# Patient Record
Sex: FEMALE | Race: BLACK OR AFRICAN AMERICAN | NOT HISPANIC OR LATINO
[De-identification: names, ages, dates, MRNs, and addresses within clinical notes are randomized per-mention and may not be internally consistent; named-entity substitution may affect disease eponyms.]

---

## 2017-01-13 ENCOUNTER — APPOINTMENT (OUTPATIENT)
Dept: BARIATRICS | Facility: CLINIC | Age: 52
End: 2017-01-13

## 2017-01-13 VITALS
DIASTOLIC BLOOD PRESSURE: 92 MMHG | WEIGHT: 226.63 LBS | SYSTOLIC BLOOD PRESSURE: 132 MMHG | HEIGHT: 66 IN | BODY MASS INDEX: 36.42 KG/M2

## 2017-03-27 ENCOUNTER — APPOINTMENT (OUTPATIENT)
Dept: BARIATRICS/WEIGHT MGMT | Facility: CLINIC | Age: 52
End: 2017-03-27

## 2018-07-10 ENCOUNTER — APPOINTMENT (OUTPATIENT)
Dept: BARIATRICS | Facility: CLINIC | Age: 53
End: 2018-07-10
Payer: COMMERCIAL

## 2018-07-10 VITALS
WEIGHT: 240.3 LBS | DIASTOLIC BLOOD PRESSURE: 80 MMHG | SYSTOLIC BLOOD PRESSURE: 140 MMHG | HEIGHT: 66 IN | BODY MASS INDEX: 38.62 KG/M2 | HEART RATE: 96 BPM | OXYGEN SATURATION: 98 %

## 2018-07-10 PROCEDURE — 99215 OFFICE O/P EST HI 40 MIN: CPT

## 2018-08-07 ENCOUNTER — APPOINTMENT (OUTPATIENT)
Dept: BARIATRICS | Facility: CLINIC | Age: 53
End: 2018-08-07
Payer: COMMERCIAL

## 2018-08-07 VITALS
HEART RATE: 104 BPM | OXYGEN SATURATION: 99 % | BODY MASS INDEX: 38.41 KG/M2 | HEIGHT: 66 IN | SYSTOLIC BLOOD PRESSURE: 128 MMHG | WEIGHT: 239 LBS | DIASTOLIC BLOOD PRESSURE: 84 MMHG

## 2018-08-07 DIAGNOSIS — Z98.84 BARIATRIC SURGERY STATUS: ICD-10-CM

## 2018-08-07 PROCEDURE — 99214 OFFICE O/P EST MOD 30 MIN: CPT

## 2018-09-06 ENCOUNTER — APPOINTMENT (OUTPATIENT)
Dept: BARIATRICS | Facility: CLINIC | Age: 53
End: 2018-09-06
Payer: COMMERCIAL

## 2018-09-06 VITALS
HEART RATE: 78 BPM | HEIGHT: 66 IN | OXYGEN SATURATION: 99 % | WEIGHT: 237 LBS | BODY MASS INDEX: 38.09 KG/M2 | DIASTOLIC BLOOD PRESSURE: 86 MMHG | SYSTOLIC BLOOD PRESSURE: 131 MMHG

## 2018-09-06 PROCEDURE — 99214 OFFICE O/P EST MOD 30 MIN: CPT

## 2018-09-28 ENCOUNTER — APPOINTMENT (OUTPATIENT)
Dept: BARIATRICS/WEIGHT MGMT | Facility: CLINIC | Age: 53
End: 2018-09-28
Payer: COMMERCIAL

## 2018-09-28 VITALS — HEIGHT: 66 IN | WEIGHT: 245 LBS | BODY MASS INDEX: 39.37 KG/M2

## 2018-09-28 PROCEDURE — 97802 MEDICAL NUTRITION INDIV IN: CPT

## 2018-10-02 ENCOUNTER — APPOINTMENT (OUTPATIENT)
Dept: BARIATRICS/WEIGHT MGMT | Facility: CLINIC | Age: 53
End: 2018-10-02
Payer: COMMERCIAL

## 2018-10-02 ENCOUNTER — APPOINTMENT (OUTPATIENT)
Dept: BARIATRICS | Facility: CLINIC | Age: 53
End: 2018-10-02
Payer: COMMERCIAL

## 2018-10-02 VITALS
HEIGHT: 66 IN | WEIGHT: 244.5 LBS | DIASTOLIC BLOOD PRESSURE: 93 MMHG | HEART RATE: 66 BPM | SYSTOLIC BLOOD PRESSURE: 132 MMHG | OXYGEN SATURATION: 98 % | BODY MASS INDEX: 39.29 KG/M2

## 2018-10-02 DIAGNOSIS — Z78.9 OTHER SPECIFIED HEALTH STATUS: ICD-10-CM

## 2018-10-02 DIAGNOSIS — Z87.19 PERSONAL HISTORY OF OTHER DISEASES OF THE DIGESTIVE SYSTEM: ICD-10-CM

## 2018-10-02 PROCEDURE — 99213 OFFICE O/P EST LOW 20 MIN: CPT

## 2018-10-02 PROCEDURE — 90791 PSYCH DIAGNOSTIC EVALUATION: CPT

## 2018-10-04 ENCOUNTER — FORM ENCOUNTER (OUTPATIENT)
Age: 53
End: 2018-10-04

## 2018-10-05 ENCOUNTER — OUTPATIENT (OUTPATIENT)
Dept: OUTPATIENT SERVICES | Facility: HOSPITAL | Age: 53
LOS: 1 days | End: 2018-10-05
Payer: COMMERCIAL

## 2018-10-05 ENCOUNTER — APPOINTMENT (OUTPATIENT)
Dept: ULTRASOUND IMAGING | Facility: CLINIC | Age: 53
End: 2018-10-05
Payer: COMMERCIAL

## 2018-10-05 DIAGNOSIS — Z00.8 ENCOUNTER FOR OTHER GENERAL EXAMINATION: ICD-10-CM

## 2018-10-05 DIAGNOSIS — Z87.898 PERSONAL HISTORY OF OTHER SPECIFIED CONDITIONS: Chronic | ICD-10-CM

## 2018-10-05 DIAGNOSIS — Z98.89 OTHER SPECIFIED POSTPROCEDURAL STATES: Chronic | ICD-10-CM

## 2018-10-05 PROCEDURE — 76700 US EXAM ABDOM COMPLETE: CPT | Mod: 26

## 2018-10-05 PROCEDURE — 76700 US EXAM ABDOM COMPLETE: CPT

## 2018-10-07 ENCOUNTER — FORM ENCOUNTER (OUTPATIENT)
Age: 53
End: 2018-10-07

## 2018-10-08 ENCOUNTER — OUTPATIENT (OUTPATIENT)
Dept: OUTPATIENT SERVICES | Facility: HOSPITAL | Age: 53
LOS: 1 days | End: 2018-10-08
Payer: COMMERCIAL

## 2018-10-08 ENCOUNTER — APPOINTMENT (OUTPATIENT)
Dept: ULTRASOUND IMAGING | Facility: CLINIC | Age: 53
End: 2018-10-08
Payer: COMMERCIAL

## 2018-10-08 DIAGNOSIS — Z98.89 OTHER SPECIFIED POSTPROCEDURAL STATES: Chronic | ICD-10-CM

## 2018-10-08 DIAGNOSIS — Z00.8 ENCOUNTER FOR OTHER GENERAL EXAMINATION: ICD-10-CM

## 2018-10-08 DIAGNOSIS — Z87.898 PERSONAL HISTORY OF OTHER SPECIFIED CONDITIONS: Chronic | ICD-10-CM

## 2018-10-08 PROCEDURE — 93970 EXTREMITY STUDY: CPT | Mod: 26

## 2018-10-08 PROCEDURE — 93970 EXTREMITY STUDY: CPT

## 2018-10-09 ENCOUNTER — APPOINTMENT (OUTPATIENT)
Dept: CARDIOLOGY | Facility: CLINIC | Age: 53
End: 2018-10-09
Payer: COMMERCIAL

## 2018-10-09 ENCOUNTER — APPOINTMENT (OUTPATIENT)
Age: 53
End: 2018-10-09
Payer: COMMERCIAL

## 2018-10-09 ENCOUNTER — NON-APPOINTMENT (OUTPATIENT)
Age: 53
End: 2018-10-09

## 2018-10-09 VITALS
OXYGEN SATURATION: 99 % | HEART RATE: 67 BPM | DIASTOLIC BLOOD PRESSURE: 84 MMHG | HEIGHT: 66 IN | SYSTOLIC BLOOD PRESSURE: 125 MMHG | BODY MASS INDEX: 38.89 KG/M2 | WEIGHT: 242 LBS

## 2018-10-09 PROCEDURE — 93306 TTE W/DOPPLER COMPLETE: CPT

## 2018-10-09 PROCEDURE — 93000 ELECTROCARDIOGRAM COMPLETE: CPT

## 2018-10-09 PROCEDURE — 99244 OFF/OP CNSLTJ NEW/EST MOD 40: CPT | Mod: 25

## 2018-10-09 RX ORDER — PROPRANOLOL HYDROCHLORIDE 120 MG/1
120 CAPSULE, EXTENDED RELEASE ORAL TWICE DAILY
Refills: 0 | Status: ACTIVE | COMMUNITY

## 2018-10-29 ENCOUNTER — APPOINTMENT (OUTPATIENT)
Dept: CARDIOLOGY | Facility: CLINIC | Age: 53
End: 2018-10-29
Payer: COMMERCIAL

## 2018-10-29 ENCOUNTER — APPOINTMENT (OUTPATIENT)
Dept: GASTROENTEROLOGY | Facility: CLINIC | Age: 53
End: 2018-10-29
Payer: COMMERCIAL

## 2018-10-29 VITALS
HEART RATE: 79 BPM | WEIGHT: 240 LBS | RESPIRATION RATE: 15 BRPM | OXYGEN SATURATION: 98 % | BODY MASS INDEX: 38.57 KG/M2 | SYSTOLIC BLOOD PRESSURE: 122 MMHG | DIASTOLIC BLOOD PRESSURE: 80 MMHG | TEMPERATURE: 98 F | HEIGHT: 66 IN

## 2018-10-29 DIAGNOSIS — Z86.79 PERSONAL HISTORY OF OTHER DISEASES OF THE CIRCULATORY SYSTEM: ICD-10-CM

## 2018-10-29 PROCEDURE — 99202 OFFICE O/P NEW SF 15 MIN: CPT

## 2018-10-29 PROCEDURE — 93015 CV STRESS TEST SUPVJ I&R: CPT

## 2018-10-30 ENCOUNTER — APPOINTMENT (OUTPATIENT)
Dept: BARIATRICS | Facility: CLINIC | Age: 53
End: 2018-10-30
Payer: COMMERCIAL

## 2018-10-30 VITALS
DIASTOLIC BLOOD PRESSURE: 86 MMHG | HEART RATE: 67 BPM | HEIGHT: 66 IN | OXYGEN SATURATION: 98 % | SYSTOLIC BLOOD PRESSURE: 124 MMHG | WEIGHT: 240 LBS | BODY MASS INDEX: 38.57 KG/M2

## 2018-10-30 PROCEDURE — 99214 OFFICE O/P EST MOD 30 MIN: CPT

## 2018-11-06 LAB
25(OH)D3 SERPL-MCNC: 7.3 NG/ML
ALBUMIN SERPL ELPH-MCNC: 4.1 G/DL
ALP BLD-CCNC: 91 U/L
ALT SERPL-CCNC: 18 U/L
ANION GAP SERPL CALC-SCNC: 8 MMOL/L
AST SERPL-CCNC: 22 U/L
BASOPHILS # BLD AUTO: 0.03 K/UL
BASOPHILS NFR BLD AUTO: 0.5 %
BILIRUB SERPL-MCNC: 0.8 MG/DL
BUN SERPL-MCNC: 9 MG/DL
CALCIUM SERPL-MCNC: 11.2 MG/DL
CALCIUM SERPL-MCNC: 11.2 MG/DL
CHLORIDE SERPL-SCNC: 108 MMOL/L
CHOLEST SERPL-MCNC: 202 MG/DL
CHOLEST/HDLC SERPL: 2.8 RATIO
CO2 SERPL-SCNC: 26 MMOL/L
CREAT SERPL-MCNC: 0.7 MG/DL
EOSINOPHIL # BLD AUTO: 0.19 K/UL
EOSINOPHIL NFR BLD AUTO: 3 %
FERRITIN SERPL-MCNC: 92 NG/ML
FOLATE SERPL-MCNC: 5.1 NG/ML
GLUCOSE SERPL-MCNC: 133 MG/DL
HBA1C MFR BLD HPLC: 6.1 %
HCT VFR BLD CALC: 40.6 %
HDLC SERPL-MCNC: 73 MG/DL
HGB BLD-MCNC: 12.9 G/DL
IMM GRANULOCYTES NFR BLD AUTO: 0.2 %
INR PPP: 1.03 RATIO
IRON SATN MFR SERPL: 37 %
IRON SERPL-MCNC: 119 UG/DL
LDLC SERPL CALC-MCNC: 112 MG/DL
LYMPHOCYTES # BLD AUTO: 2.39 K/UL
LYMPHOCYTES NFR BLD AUTO: 37.9 %
MAN DIFF?: NORMAL
MCHC RBC-ENTMCNC: 27.6 PG
MCHC RBC-ENTMCNC: 31.8 GM/DL
MCV RBC AUTO: 86.9 FL
MONOCYTES # BLD AUTO: 0.36 K/UL
MONOCYTES NFR BLD AUTO: 5.7 %
NEUTROPHILS # BLD AUTO: 3.33 K/UL
NEUTROPHILS NFR BLD AUTO: 52.7 %
PARATHYROID HORMONE INTACT: 117 PG/ML
PLATELET # BLD AUTO: 328 K/UL
POTASSIUM SERPL-SCNC: 4.5 MMOL/L
PROT SERPL-MCNC: 7.1 G/DL
PT BLD: 11.7 SEC
RBC # BLD: 4.67 M/UL
RBC # FLD: 14.3 %
SODIUM SERPL-SCNC: 142 MMOL/L
TIBC SERPL-MCNC: 324 UG/DL
TRIGL SERPL-MCNC: 87 MG/DL
TSH SERPL-ACNC: 0.9 UIU/ML
UIBC SERPL-MCNC: 205 UG/DL
VIT A SERPL-MCNC: 30.6 UG/DL
VIT B1 SERPL-MCNC: 103 NMOL/L
VIT B12 SERPL-MCNC: 449 PG/ML
VIT B6 SERPL-MCNC: 11.4 UG/L
WBC # FLD AUTO: 6.31 K/UL

## 2018-11-13 ENCOUNTER — APPOINTMENT (OUTPATIENT)
Dept: GASTROENTEROLOGY | Facility: AMBULATORY MEDICAL SERVICES | Age: 53
End: 2018-11-13
Payer: COMMERCIAL

## 2018-11-13 PROCEDURE — 45380 COLONOSCOPY AND BIOPSY: CPT

## 2018-11-13 PROCEDURE — 43239 EGD BIOPSY SINGLE/MULTIPLE: CPT

## 2018-11-26 ENCOUNTER — RESULT REVIEW (OUTPATIENT)
Age: 53
End: 2018-11-26

## 2018-11-28 ENCOUNTER — OUTPATIENT (OUTPATIENT)
Dept: OUTPATIENT SERVICES | Facility: HOSPITAL | Age: 53
LOS: 1 days | End: 2018-11-28
Payer: COMMERCIAL

## 2018-11-28 ENCOUNTER — APPOINTMENT (OUTPATIENT)
Dept: BARIATRICS | Facility: CLINIC | Age: 53
End: 2018-11-28
Payer: COMMERCIAL

## 2018-11-28 VITALS
BODY MASS INDEX: 39.15 KG/M2 | SYSTOLIC BLOOD PRESSURE: 122 MMHG | HEIGHT: 66 IN | WEIGHT: 243.59 LBS | HEART RATE: 77 BPM | DIASTOLIC BLOOD PRESSURE: 90 MMHG | OXYGEN SATURATION: 99 %

## 2018-11-28 DIAGNOSIS — Z98.84 BARIATRIC SURGERY STATUS: ICD-10-CM

## 2018-11-28 DIAGNOSIS — Z87.898 PERSONAL HISTORY OF OTHER SPECIFIED CONDITIONS: Chronic | ICD-10-CM

## 2018-11-28 DIAGNOSIS — Z98.89 OTHER SPECIFIED POSTPROCEDURAL STATES: Chronic | ICD-10-CM

## 2018-11-28 DIAGNOSIS — Z78.9 OTHER SPECIFIED HEALTH STATUS: ICD-10-CM

## 2018-11-28 DIAGNOSIS — E66.9 OBESITY, UNSPECIFIED: ICD-10-CM

## 2018-11-28 PROCEDURE — 74220 X-RAY XM ESOPHAGUS 1CNTRST: CPT

## 2018-11-28 PROCEDURE — 74220 X-RAY XM ESOPHAGUS 1CNTRST: CPT | Mod: 26

## 2018-11-28 PROCEDURE — 99214 OFFICE O/P EST MOD 30 MIN: CPT

## 2018-11-28 RX ORDER — IBUPROFEN 600 MG/1
600 TABLET, FILM COATED ORAL
Qty: 30 | Refills: 0 | Status: DISCONTINUED | COMMUNITY
Start: 2018-07-19

## 2018-11-28 RX ORDER — SULFAMETHOXAZOLE AND TRIMETHOPRIM 800; 160 MG/1; MG/1
800-160 TABLET ORAL
Qty: 14 | Refills: 0 | Status: DISCONTINUED | COMMUNITY
Start: 2018-07-19

## 2018-11-28 RX ORDER — CEPHALEXIN 500 MG/1
500 CAPSULE ORAL
Qty: 21 | Refills: 0 | Status: DISCONTINUED | COMMUNITY
Start: 2018-07-19

## 2018-11-28 RX ORDER — FLUCONAZOLE 150 MG/1
150 TABLET ORAL
Qty: 1 | Refills: 0 | Status: DISCONTINUED | COMMUNITY
Start: 2018-07-19

## 2018-11-29 ENCOUNTER — APPOINTMENT (OUTPATIENT)
Dept: NUCLEAR MEDICINE | Facility: IMAGING CENTER | Age: 53
End: 2018-11-29

## 2018-12-14 ENCOUNTER — APPOINTMENT (OUTPATIENT)
Dept: BARIATRICS | Facility: CLINIC | Age: 53
End: 2018-12-14
Payer: COMMERCIAL

## 2018-12-14 VITALS
WEIGHT: 235.89 LBS | HEIGHT: 66 IN | DIASTOLIC BLOOD PRESSURE: 80 MMHG | SYSTOLIC BLOOD PRESSURE: 130 MMHG | HEART RATE: 80 BPM | OXYGEN SATURATION: 97 % | BODY MASS INDEX: 37.91 KG/M2

## 2018-12-14 PROCEDURE — 99214 OFFICE O/P EST MOD 30 MIN: CPT

## 2018-12-21 ENCOUNTER — OUTPATIENT (OUTPATIENT)
Dept: OUTPATIENT SERVICES | Facility: HOSPITAL | Age: 53
LOS: 1 days | End: 2018-12-21
Payer: COMMERCIAL

## 2018-12-21 VITALS
TEMPERATURE: 98 F | HEIGHT: 67 IN | OXYGEN SATURATION: 100 % | HEART RATE: 75 BPM | SYSTOLIC BLOOD PRESSURE: 115 MMHG | DIASTOLIC BLOOD PRESSURE: 82 MMHG | RESPIRATION RATE: 16 BRPM | WEIGHT: 246.04 LBS

## 2018-12-21 DIAGNOSIS — G47.33 OBSTRUCTIVE SLEEP APNEA (ADULT) (PEDIATRIC): ICD-10-CM

## 2018-12-21 DIAGNOSIS — Z98.84 BARIATRIC SURGERY STATUS: Chronic | ICD-10-CM

## 2018-12-21 DIAGNOSIS — Z98.84 BARIATRIC SURGERY STATUS: ICD-10-CM

## 2018-12-21 DIAGNOSIS — G47.30 SLEEP APNEA, UNSPECIFIED: ICD-10-CM

## 2018-12-21 DIAGNOSIS — I10 ESSENTIAL (PRIMARY) HYPERTENSION: ICD-10-CM

## 2018-12-21 DIAGNOSIS — Z87.898 PERSONAL HISTORY OF OTHER SPECIFIED CONDITIONS: Chronic | ICD-10-CM

## 2018-12-21 DIAGNOSIS — E66.9 OBESITY, UNSPECIFIED: ICD-10-CM

## 2018-12-21 DIAGNOSIS — Z98.89 OTHER SPECIFIED POSTPROCEDURAL STATES: Chronic | ICD-10-CM

## 2018-12-21 DIAGNOSIS — Z01.818 ENCOUNTER FOR OTHER PREPROCEDURAL EXAMINATION: ICD-10-CM

## 2018-12-21 LAB
ALBUMIN SERPL ELPH-MCNC: 3.5 G/DL — SIGNIFICANT CHANGE UP (ref 3.3–5)
ALP SERPL-CCNC: 117 U/L — SIGNIFICANT CHANGE UP (ref 30–120)
ALT FLD-CCNC: 29 U/L DA — SIGNIFICANT CHANGE UP (ref 10–60)
ANION GAP SERPL CALC-SCNC: 8 MMOL/L — SIGNIFICANT CHANGE UP (ref 5–17)
AST SERPL-CCNC: 16 U/L — SIGNIFICANT CHANGE UP (ref 10–40)
BILIRUB SERPL-MCNC: 0.6 MG/DL — SIGNIFICANT CHANGE UP (ref 0.2–1.2)
BLD GP AB SCN SERPL QL: SIGNIFICANT CHANGE UP
BUN SERPL-MCNC: 13 MG/DL — SIGNIFICANT CHANGE UP (ref 7–23)
CALCIUM SERPL-MCNC: 10.7 MG/DL — HIGH (ref 8.4–10.5)
CHLORIDE SERPL-SCNC: 107 MMOL/L — SIGNIFICANT CHANGE UP (ref 96–108)
CO2 SERPL-SCNC: 27 MMOL/L — SIGNIFICANT CHANGE UP (ref 22–31)
CREAT SERPL-MCNC: 0.93 MG/DL — SIGNIFICANT CHANGE UP (ref 0.5–1.3)
GLUCOSE SERPL-MCNC: 105 MG/DL — HIGH (ref 70–99)
HCT VFR BLD CALC: 43.3 % — SIGNIFICANT CHANGE UP (ref 34.5–45)
HGB BLD-MCNC: 14 G/DL — SIGNIFICANT CHANGE UP (ref 11.5–15.5)
MCHC RBC-ENTMCNC: 29.2 PG — SIGNIFICANT CHANGE UP (ref 27–34)
MCHC RBC-ENTMCNC: 32.3 GM/DL — SIGNIFICANT CHANGE UP (ref 32–36)
MCV RBC AUTO: 90.4 FL — SIGNIFICANT CHANGE UP (ref 80–100)
NRBC # BLD: 0 /100 WBCS — SIGNIFICANT CHANGE UP (ref 0–0)
PLATELET # BLD AUTO: 306 K/UL — SIGNIFICANT CHANGE UP (ref 150–400)
POTASSIUM SERPL-MCNC: 4.1 MMOL/L — SIGNIFICANT CHANGE UP (ref 3.5–5.3)
POTASSIUM SERPL-SCNC: 4.1 MMOL/L — SIGNIFICANT CHANGE UP (ref 3.5–5.3)
PROT SERPL-MCNC: 7.3 G/DL — SIGNIFICANT CHANGE UP (ref 6–8.3)
RBC # BLD: 4.79 M/UL — SIGNIFICANT CHANGE UP (ref 3.8–5.2)
RBC # FLD: 14.3 % — SIGNIFICANT CHANGE UP (ref 10.3–14.5)
SODIUM SERPL-SCNC: 142 MMOL/L — SIGNIFICANT CHANGE UP (ref 135–145)
WBC # BLD: 6.21 K/UL — SIGNIFICANT CHANGE UP (ref 3.8–10.5)
WBC # FLD AUTO: 6.21 K/UL — SIGNIFICANT CHANGE UP (ref 3.8–10.5)

## 2018-12-21 PROCEDURE — 86850 RBC ANTIBODY SCREEN: CPT

## 2018-12-21 PROCEDURE — 86901 BLOOD TYPING SEROLOGIC RH(D): CPT

## 2018-12-21 PROCEDURE — 86900 BLOOD TYPING SEROLOGIC ABO: CPT

## 2018-12-21 PROCEDURE — G0463: CPT

## 2018-12-21 PROCEDURE — 80053 COMPREHEN METABOLIC PANEL: CPT

## 2018-12-21 PROCEDURE — 85027 COMPLETE CBC AUTOMATED: CPT

## 2018-12-21 PROCEDURE — 36415 COLL VENOUS BLD VENIPUNCTURE: CPT

## 2018-12-21 NOTE — H&P PST ADULT - PROBLEM SELECTOR PLAN 1
Laparoscopic sleeve gastrectomy w/upper endoscopy on 1/7/18  Diagnostic testing  Pending medical clearance  Instructions reviewed and signed  Best wishes offered

## 2018-12-21 NOTE — H&P PST ADULT - NEGATIVE ENMT SYMPTOMS
no nasal discharge/no throat pain/no nose bleeds/no recurrent cold sores/no abnormal taste sensation/no gum bleeding/no dry mouth/no dysphagia/no ear pain/no tinnitus/no sinus symptoms/no nasal congestion/no nasal obstruction/no post-nasal discharge/no hearing difficulty/no vertigo

## 2018-12-21 NOTE — H&P PST ADULT - HISTORY OF PRESENT ILLNESS
52 yo female presents today for PST visit for scheduled laparoscopic sleeve gastrectomy with upper endoscopy on 1/7/18 with Yareli Tanner MD.  This patient presents no complaints today.

## 2018-12-28 RX ORDER — HYOSCYAMINE SULFATE 0.13 MG
0.12 TABLET ORAL EVERY 6 HOURS
Qty: 0 | Refills: 0 | Status: DISCONTINUED | OUTPATIENT
Start: 2019-01-07 | End: 2019-01-09

## 2018-12-28 RX ORDER — PANTOPRAZOLE SODIUM 20 MG/1
40 TABLET, DELAYED RELEASE ORAL DAILY
Qty: 0 | Refills: 0 | Status: DISCONTINUED | OUTPATIENT
Start: 2019-01-07 | End: 2019-01-09

## 2018-12-28 RX ORDER — ENOXAPARIN SODIUM 100 MG/ML
40 INJECTION SUBCUTANEOUS EVERY 12 HOURS
Qty: 0 | Refills: 0 | Status: DISCONTINUED | OUTPATIENT
Start: 2019-01-07 | End: 2019-01-09

## 2018-12-28 RX ORDER — ONDANSETRON 8 MG/1
4 TABLET, FILM COATED ORAL EVERY 6 HOURS
Qty: 0 | Refills: 0 | Status: DISCONTINUED | OUTPATIENT
Start: 2019-01-07 | End: 2019-01-09

## 2018-12-28 RX ORDER — ENOXAPARIN SODIUM 100 MG/ML
40 INJECTION SUBCUTANEOUS ONCE
Qty: 0 | Refills: 0 | Status: COMPLETED | OUTPATIENT
Start: 2019-01-07 | End: 2019-01-07

## 2018-12-28 RX ORDER — SODIUM CHLORIDE 9 MG/ML
1000 INJECTION, SOLUTION INTRAVENOUS
Qty: 0 | Refills: 0 | Status: DISCONTINUED | OUTPATIENT
Start: 2019-01-07 | End: 2019-01-09

## 2018-12-28 RX ORDER — SODIUM CHLORIDE 9 MG/ML
1000 INJECTION, SOLUTION INTRAVENOUS
Qty: 0 | Refills: 0 | Status: DISCONTINUED | OUTPATIENT
Start: 2019-01-07 | End: 2019-01-08

## 2018-12-28 RX ORDER — HYDROMORPHONE HYDROCHLORIDE 2 MG/ML
0.5 INJECTION INTRAMUSCULAR; INTRAVENOUS; SUBCUTANEOUS EVERY 4 HOURS
Qty: 0 | Refills: 0 | Status: DISCONTINUED | OUTPATIENT
Start: 2019-01-07 | End: 2019-01-09

## 2019-01-06 ENCOUNTER — TRANSCRIPTION ENCOUNTER (OUTPATIENT)
Age: 54
End: 2019-01-06

## 2019-01-07 ENCOUNTER — INPATIENT (INPATIENT)
Facility: HOSPITAL | Age: 54
LOS: 1 days | Discharge: HOME | DRG: 621 | End: 2019-01-09
Attending: SURGERY | Admitting: SURGERY
Payer: COMMERCIAL

## 2019-01-07 ENCOUNTER — APPOINTMENT (OUTPATIENT)
Dept: BARIATRICS | Facility: HOSPITAL | Age: 54
End: 2019-01-07
Payer: COMMERCIAL

## 2019-01-07 ENCOUNTER — RESULT REVIEW (OUTPATIENT)
Age: 54
End: 2019-01-07

## 2019-01-07 ENCOUNTER — TRANSCRIPTION ENCOUNTER (OUTPATIENT)
Age: 54
End: 2019-01-07

## 2019-01-07 VITALS
TEMPERATURE: 98 F | OXYGEN SATURATION: 100 % | WEIGHT: 241.19 LBS | RESPIRATION RATE: 12 BRPM | SYSTOLIC BLOOD PRESSURE: 148 MMHG | DIASTOLIC BLOOD PRESSURE: 84 MMHG | HEART RATE: 66 BPM

## 2019-01-07 DIAGNOSIS — Z98.89 OTHER SPECIFIED POSTPROCEDURAL STATES: Chronic | ICD-10-CM

## 2019-01-07 DIAGNOSIS — E66.9 OBESITY, UNSPECIFIED: ICD-10-CM

## 2019-01-07 DIAGNOSIS — Z98.84 BARIATRIC SURGERY STATUS: Chronic | ICD-10-CM

## 2019-01-07 DIAGNOSIS — Z98.84 BARIATRIC SURGERY STATUS: ICD-10-CM

## 2019-01-07 DIAGNOSIS — G47.30 SLEEP APNEA, UNSPECIFIED: ICD-10-CM

## 2019-01-07 DIAGNOSIS — I10 ESSENTIAL (PRIMARY) HYPERTENSION: ICD-10-CM

## 2019-01-07 LAB — HCG UR QL: NEGATIVE — SIGNIFICANT CHANGE UP

## 2019-01-07 PROCEDURE — 43775 LAP SLEEVE GASTRECTOMY: CPT | Mod: AS

## 2019-01-07 PROCEDURE — 88305 TISSUE EXAM BY PATHOLOGIST: CPT | Mod: 26

## 2019-01-07 PROCEDURE — 43775 LAP SLEEVE GASTRECTOMY: CPT | Mod: 22

## 2019-01-07 PROCEDURE — 88312 SPECIAL STAINS GROUP 1: CPT | Mod: 26

## 2019-01-07 RX ORDER — ONDANSETRON 8 MG/1
4 TABLET, FILM COATED ORAL ONCE
Qty: 0 | Refills: 0 | Status: DISCONTINUED | OUTPATIENT
Start: 2019-01-07 | End: 2019-01-07

## 2019-01-07 RX ORDER — CHLORHEXIDINE GLUCONATE 213 G/1000ML
1 SOLUTION TOPICAL ONCE
Qty: 0 | Refills: 0 | Status: COMPLETED | OUTPATIENT
Start: 2019-01-07 | End: 2019-01-07

## 2019-01-07 RX ORDER — APREPITANT 80 MG/1
40 CAPSULE ORAL ONCE
Qty: 0 | Refills: 0 | Status: COMPLETED | OUTPATIENT
Start: 2019-01-07 | End: 2019-01-07

## 2019-01-07 RX ORDER — SCOPALAMINE 1 MG/3D
1 PATCH, EXTENDED RELEASE TRANSDERMAL ONCE
Qty: 0 | Refills: 0 | Status: COMPLETED | OUTPATIENT
Start: 2019-01-07 | End: 2019-01-07

## 2019-01-07 RX ORDER — ACETAMINOPHEN 500 MG
1000 TABLET ORAL EVERY 6 HOURS
Qty: 0 | Refills: 0 | Status: COMPLETED | OUTPATIENT
Start: 2019-01-07 | End: 2019-01-08

## 2019-01-07 RX ORDER — CIPROFLOXACIN LACTATE 400MG/40ML
400 VIAL (ML) INTRAVENOUS ONCE
Qty: 0 | Refills: 0 | Status: COMPLETED | OUTPATIENT
Start: 2019-01-07 | End: 2019-01-07

## 2019-01-07 RX ORDER — IBUPROFEN 200 MG
800 TABLET ORAL EVERY 6 HOURS
Qty: 0 | Refills: 0 | Status: DISCONTINUED | OUTPATIENT
Start: 2019-01-07 | End: 2019-01-09

## 2019-01-07 RX ORDER — HYDROMORPHONE HYDROCHLORIDE 2 MG/ML
0.5 INJECTION INTRAMUSCULAR; INTRAVENOUS; SUBCUTANEOUS
Qty: 0 | Refills: 0 | Status: DISCONTINUED | OUTPATIENT
Start: 2019-01-07 | End: 2019-01-07

## 2019-01-07 RX ORDER — ACETAMINOPHEN 500 MG
1000 TABLET ORAL EVERY 6 HOURS
Qty: 0 | Refills: 0 | Status: DISCONTINUED | OUTPATIENT
Start: 2019-01-08 | End: 2019-01-08

## 2019-01-07 RX ORDER — ACETAMINOPHEN 500 MG
1000 TABLET ORAL ONCE
Qty: 0 | Refills: 0 | Status: COMPLETED | OUTPATIENT
Start: 2019-01-07 | End: 2019-01-07

## 2019-01-07 RX ORDER — SODIUM CHLORIDE 9 MG/ML
1000 INJECTION, SOLUTION INTRAVENOUS
Qty: 0 | Refills: 0 | Status: DISCONTINUED | OUTPATIENT
Start: 2019-01-07 | End: 2019-01-07

## 2019-01-07 RX ADMIN — APREPITANT 40 MILLIGRAM(S): 80 CAPSULE ORAL at 06:39

## 2019-01-07 RX ADMIN — Medication 516 MILLIGRAM(S): at 20:58

## 2019-01-07 RX ADMIN — SCOPALAMINE 1 PATCH: 1 PATCH, EXTENDED RELEASE TRANSDERMAL at 11:28

## 2019-01-07 RX ADMIN — HYDROMORPHONE HYDROCHLORIDE 0.5 MILLIGRAM(S): 2 INJECTION INTRAMUSCULAR; INTRAVENOUS; SUBCUTANEOUS at 12:48

## 2019-01-07 RX ADMIN — HYDROMORPHONE HYDROCHLORIDE 0.5 MILLIGRAM(S): 2 INJECTION INTRAMUSCULAR; INTRAVENOUS; SUBCUTANEOUS at 13:20

## 2019-01-07 RX ADMIN — SODIUM CHLORIDE 100 MILLILITER(S): 9 INJECTION, SOLUTION INTRAVENOUS at 12:24

## 2019-01-07 RX ADMIN — Medication 400 MILLIGRAM(S): at 20:59

## 2019-01-07 RX ADMIN — CHLORHEXIDINE GLUCONATE 1 APPLICATION(S): 213 SOLUTION TOPICAL at 06:30

## 2019-01-07 RX ADMIN — ONDANSETRON 4 MILLIGRAM(S): 8 TABLET, FILM COATED ORAL at 17:36

## 2019-01-07 RX ADMIN — Medication 400 MILLIGRAM(S): at 16:11

## 2019-01-07 RX ADMIN — Medication 800 MILLIGRAM(S): at 21:50

## 2019-01-07 RX ADMIN — Medication 516 MILLIGRAM(S): at 12:23

## 2019-01-07 RX ADMIN — Medication 800 MILLIGRAM(S): at 13:20

## 2019-01-07 RX ADMIN — ENOXAPARIN SODIUM 40 MILLIGRAM(S): 100 INJECTION SUBCUTANEOUS at 21:02

## 2019-01-07 RX ADMIN — Medication 1000 MILLIGRAM(S): at 21:30

## 2019-01-07 RX ADMIN — SODIUM CHLORIDE 1000 MILLILITER(S): 9 INJECTION, SOLUTION INTRAVENOUS at 06:30

## 2019-01-07 RX ADMIN — ENOXAPARIN SODIUM 40 MILLIGRAM(S): 100 INJECTION SUBCUTANEOUS at 07:30

## 2019-01-07 NOTE — CONSULT NOTE ADULT - SUBJECTIVE AND OBJECTIVE BOX
PULMONARY/CRITICAL CARE        Patient is a 53y old  Female who presents with a chief complaint of gastric sleeve  BRIEF HOSPITAL COURSE: ***    Events last 24 hours: ***    PAST MEDICAL & SURGICAL HISTORY:  Obesity (BMI 30-39.9)  Sleep apnea--intolerant of cpap  Hypertension  H/O laparoscopic adjustable gastric bandin  S/P rotator cuff repair: left and right  2015, 2016    Allergies    adhesives (Rash)  latex (Swelling)  penicillin (Hives)  shellfish (Anaphylaxis)    Intolerances      FAMILY HISTORY:  Family history of breast cancer (Mother, Aunt)      Review of Systems:  CONSTITUTIONAL: No fever, chills, or fatigue  EYES: No eye pain, visual disturbances, or discharge  ENMT:  No difficulty hearing, tinnitus, vertigo; No sinus or throat pain  NECK: No pain or stiffness  RESPIRATORY: No cough, wheezing, chills or hemoptysis; No shortness of breath  CARDIOVASCULAR: No chest pain, palpitations, dizziness, or leg swelling  GASTROINTESTINAL: No abdominal or epigastric pain. No nausea, vomiting, or hematemesis; No diarrhea or constipation. No melena or hematochezia.  GENITOURINARY: No dysuria, frequency, hematuria, or incontinence  NEUROLOGICAL: No headaches, memory loss, loss of strength, numbness, or tremors  SKIN: No itching, burning, rashes, or lesions   MUSCULOSKELETAL: No joint pain or swelling; No muscle, back, or extremity pain  PSYCHIATRIC: No depression, anxiety, mood swings, or difficulty sleeping      Medications:                    lactated ringers. 1000 milliLiter(s) IV Continuous <Continuous>                ICU Vital Signs Last 24 Hrs  T(C): 36.4 (2019 06:17), Max: 36.4 (2019 06:17)  T(F): 97.6 (2019 06:17), Max: 97.6 (2019 06:17)  HR: 66 (2019 06:17) (66 - 66)  BP: 148/84 (2019 06:17) (148/84 - 148/84)  BP(mean): --  ABP: --  ABP(mean): --  RR: 12 (2019 06:17) (12 - 12)  SpO2: 100% (2019 06:17) (100% - 100%)    Vital Signs Last 24 Hrs  T(C): 36.4 (2019 06:17), Max: 36.4 (2019 06:17)  T(F): 97.6 (2019 06:17), Max: 97.6 (2019 06:17)  HR: 66 (2019 06:17) (66 - 66)  BP: 148/84 (2019 06:17) (148/84 - 148/84)  BP(mean): --  RR: 12 (2019 06:17) (12 - 12)  SpO2: 100% (2019 06:17) (100% - 100%)        I&O's Detail        LABS:                CAPILLARY BLOOD GLUCOSE            CULTURES:      Physical Examination:    General: No acute distress.  Obese female    HEENT: Pupils equal, reactive to light.  Symmetric.    PULM: Clear to auscultation bilaterally, no significant sputum production    CVS: Regular rate and rhythm, no murmurs, rubs, or gallops    ABD: Soft, nondistended, nontender, normoactive bowel sounds, no masses    EXT: No edema, nontender    SKIN: Warm and well perfused, no rashes noted.    NEURO: Alert, oriented, interactive, nonfocal    RADIOLOGY: ***    CRITICAL CARE TIME SPENT: ***

## 2019-01-07 NOTE — DISCHARGE NOTE ADULT - REASON FOR ADMISSION
52 yo F with PMHx of morbid obesity admitted to [facility] for scheduled laparoscopic sleeve gastrectomy and intra-operative EGD with hiatal hernia repair.

## 2019-01-07 NOTE — DISCHARGE NOTE ADULT - INSTRUCTIONS
Instructed to ambulate and use incentive spirometry frequently. Ice packs to abdominal wall and shoulders as needed for discomfort. Cont bariatric diet and follow nutritional guidelines as instructed. Pain meds as needed by MD. Pt instructed  to follow up with Dr. Tanner in 1 week.

## 2019-01-07 NOTE — CONSULT NOTE ADULT - PROBLEM SELECTOR PROBLEM 1
Pt arrived to ED alert and oriented x4. Pt reports vaginal discharge, itching, odor. Pt reports that on January 3rd she began to have white creamy discharge and reports that on January 6th she began to have itching and an odor stating \"I smelt like fish\". Pt reports that she used OTC creams to help with her symptoms stating that it helped in the past and reports that her symptoms did not get better. Pt reports that she still has the discharge but reports that she no longer has the odor or itching. Pt denies abdominal pain, dines urinary complaints. Pt provided with urine cup for specimen collection, ambulated to restroom with steady gait. RR even and unlabored. NAD noted. Will continue to monitor.       Ankur Lockhart RN  01/10/18 [id number]
Sleep apnea

## 2019-01-07 NOTE — DISCHARGE NOTE ADULT - PATIENT PORTAL LINK FT
You can access the [email or web address] Patient Portal, offered by [facility], by registering with the following website: [email or web address]

## 2019-01-07 NOTE — DISCHARGE NOTE ADULT - HOSPITAL COURSE
54 yo F with history of morbid obesity s/p laparoscopic sleeve gastrectomy and intra- operative EGD and hiatal hernia repair.  Post operatively patient did well, good urine output and ambulating well on floor. Pt advanced to bariatric clears which she tolerated . Nutritional guidelines were reviewed with the nutritionist. Patient felt ready for discharge to home. Pt instructed to drink small frequent amounts, start protein drinks and follow dietary guidelines. Instructed to ambulate and use incentive spirometry frequently. Pt to follow up with Dr. Tanner in 1 week and call with any questions or concerns.

## 2019-01-07 NOTE — DISCHARGE NOTE ADULT - MEDICATION SUMMARY - MEDICATIONS TO TAKE
I will START or STAY ON the medications listed below when I get home from the hospital:    Percocet 5/325 oral tablet  -- 1 tab(s) by mouth every 6 hours as needed for moderate to severe pain  crush and put in low fat yogurt or pudding.   -- Indication: For S/P laparoscopic sleeve gastrectomy    ondansetron 4 mg oral tablet, disintegrating  -- 1 tab(s) by mouth 3 times a day as needed for nausea   -- Indication: For anti emetic    omeprazole 20 mg oral delayed release capsule  -- 1 cap(s) by mouth once a day open and put in low fat yogurt or pudding.   -- Indication: For S/P laparoscopic sleeve gastrectomy I will START or STAY ON the medications listed below when I get home from the hospital:    Percocet 5/325 oral tablet  -- 1 tab(s) by mouth every 6 hours as needed for moderate to severe pain  crush and put in low fat yogurt or pudding.   -- Indication: For S/P laparoscopic sleeve gastrectomy    propranolol 40 mg oral tablet  -- 1 tab(s) by mouth 3 times a day, if larger than tic tac, crush and put in low fat yogurt or pudding. continue to monitor BP  -- It is very important that you take or use this exactly as directed.  Do not skip doses or discontinue unless directed by your doctor.  May cause drowsiness.  Alcohol may intensify this effect.  Use care when operating dangerous machinery.  Some non-prescription drugs may aggravate your condition.  Read all labels carefully.  If a warning appears, check with your doctor before taking.    -- Indication: For Hypertension    ondansetron 4 mg oral tablet, disintegrating  -- 1 tab(s) by mouth 3 times a day as needed for nausea   -- Indication: For anti emetic    omeprazole 20 mg oral delayed release capsule  -- 1 cap(s) by mouth once a day open and put in low fat yogurt or pudding.   -- Indication: For S/P laparoscopic sleeve gastrectomy

## 2019-01-07 NOTE — DISCHARGE NOTE ADULT - CARE PLAN
Principal Discharge DX:	Obesity (BMI 30-39.9)  Goal:	Restriction of dietary intake and return to normal BMI.  Assessment and plan of treatment:	Instructed to ambulate and use incentive spirometry frequently. Ice packs to abdominal wall and shoulders as needed for discomfort. Cont bariatric diet and follow nutritional guidelines as instructed. Pain meds as needed by MD. Pt instructed  to follow up with Dr. Tanner in 1 week.  Secondary Diagnosis:	S/P laparoscopic sleeve gastrectomy  Goal:	Restriction of dietary intake and return to normal BMI.  Assessment and plan of treatment:	Instructed to ambulate and use incentive spirometry frequently. Ice packs to abdominal wall and shoulders as needed for discomfort. Cont bariatric diet and follow nutritional guidelines as instructed. Pain meds as needed by MD. Pt instructed  to follow up with Dr. Tanner in 1 week.  Secondary Diagnosis:	Hiatal hernia

## 2019-01-07 NOTE — DISCHARGE NOTE ADULT - NS AS ACTIVITY OBS
Stairs allowed/No Heavy lifting/straining/Walking-Outdoors allowed/Do not make important decisions/Do not drive or operate machinery/Showering allowed/Walking-Indoors allowed

## 2019-01-07 NOTE — DISCHARGE NOTE ADULT - PLAN OF CARE
Restriction of dietary intake and return to normal BMI. Instructed to ambulate and use incentive spirometry frequently. Ice packs to abdominal wall and shoulders as needed for discomfort. Cont bariatric diet and follow nutritional guidelines as instructed. Pain meds as needed by MD. Pt instructed  to follow up with Dr. Tanner in 1 week.

## 2019-01-07 NOTE — DISCHARGE NOTE ADULT - MEDICATION SUMMARY - MEDICATIONS TO STOP TAKING
I will STOP taking the medications listed below when I get home from the hospital:    propranolol 120 mg oral capsule, extended release  -- 1 cap(s) by mouth once a day

## 2019-01-07 NOTE — BRIEF OPERATIVE NOTE - PROCEDURE
<<-----Click on this checkbox to enter Procedure Gastrectomy, sleeve, laparoscopic, with laparoscopic hiatal hernia repair  01/07/2019  lysis of adhesions   greater than 45 minutes  EGD  Active  LWERMELING

## 2019-01-08 LAB
ANION GAP SERPL CALC-SCNC: 6 MMOL/L — SIGNIFICANT CHANGE UP (ref 5–17)
BUN SERPL-MCNC: 9 MG/DL — SIGNIFICANT CHANGE UP (ref 7–23)
CALCIUM SERPL-MCNC: 11 MG/DL — HIGH (ref 8.4–10.5)
CHLORIDE SERPL-SCNC: 105 MMOL/L — SIGNIFICANT CHANGE UP (ref 96–108)
CO2 SERPL-SCNC: 26 MMOL/L — SIGNIFICANT CHANGE UP (ref 22–31)
CREAT SERPL-MCNC: 0.86 MG/DL — SIGNIFICANT CHANGE UP (ref 0.5–1.3)
GLUCOSE SERPL-MCNC: 99 MG/DL — SIGNIFICANT CHANGE UP (ref 70–99)
HCT VFR BLD CALC: 40.4 % — SIGNIFICANT CHANGE UP (ref 34.5–45)
HGB BLD-MCNC: 13.5 G/DL — SIGNIFICANT CHANGE UP (ref 11.5–15.5)
MCHC RBC-ENTMCNC: 29.2 PG — SIGNIFICANT CHANGE UP (ref 27–34)
MCHC RBC-ENTMCNC: 33.4 GM/DL — SIGNIFICANT CHANGE UP (ref 32–36)
MCV RBC AUTO: 87.4 FL — SIGNIFICANT CHANGE UP (ref 80–100)
NRBC # BLD: 0 /100 WBCS — SIGNIFICANT CHANGE UP (ref 0–0)
PLATELET # BLD AUTO: 317 K/UL — SIGNIFICANT CHANGE UP (ref 150–400)
POTASSIUM SERPL-MCNC: 4.3 MMOL/L — SIGNIFICANT CHANGE UP (ref 3.5–5.3)
POTASSIUM SERPL-SCNC: 4.3 MMOL/L — SIGNIFICANT CHANGE UP (ref 3.5–5.3)
RBC # BLD: 4.62 M/UL — SIGNIFICANT CHANGE UP (ref 3.8–5.2)
RBC # FLD: 13.6 % — SIGNIFICANT CHANGE UP (ref 10.3–14.5)
SODIUM SERPL-SCNC: 137 MMOL/L — SIGNIFICANT CHANGE UP (ref 135–145)
WBC # BLD: 10.43 K/UL — SIGNIFICANT CHANGE UP (ref 3.8–10.5)
WBC # FLD AUTO: 10.43 K/UL — SIGNIFICANT CHANGE UP (ref 3.8–10.5)

## 2019-01-08 RX ORDER — ACETAMINOPHEN 500 MG
1000 TABLET ORAL EVERY 6 HOURS
Qty: 0 | Refills: 0 | Status: DISCONTINUED | OUTPATIENT
Start: 2019-01-08 | End: 2019-01-09

## 2019-01-08 RX ADMIN — ENOXAPARIN SODIUM 40 MILLIGRAM(S): 100 INJECTION SUBCUTANEOUS at 09:35

## 2019-01-08 RX ADMIN — Medication 1000 MILLIGRAM(S): at 04:47

## 2019-01-08 RX ADMIN — ONDANSETRON 4 MILLIGRAM(S): 8 TABLET, FILM COATED ORAL at 18:02

## 2019-01-08 RX ADMIN — HYDROMORPHONE HYDROCHLORIDE 0.5 MILLIGRAM(S): 2 INJECTION INTRAMUSCULAR; INTRAVENOUS; SUBCUTANEOUS at 03:50

## 2019-01-08 RX ADMIN — Medication 1000 MILLIGRAM(S): at 11:28

## 2019-01-08 RX ADMIN — ONDANSETRON 4 MILLIGRAM(S): 8 TABLET, FILM COATED ORAL at 12:21

## 2019-01-08 RX ADMIN — Medication 1000 MILLIGRAM(S): at 21:31

## 2019-01-08 RX ADMIN — ONDANSETRON 4 MILLIGRAM(S): 8 TABLET, FILM COATED ORAL at 06:19

## 2019-01-08 RX ADMIN — Medication 400 MILLIGRAM(S): at 10:37

## 2019-01-08 RX ADMIN — PANTOPRAZOLE SODIUM 40 MILLIGRAM(S): 20 TABLET, DELAYED RELEASE ORAL at 12:21

## 2019-01-08 RX ADMIN — ONDANSETRON 4 MILLIGRAM(S): 8 TABLET, FILM COATED ORAL at 23:36

## 2019-01-08 RX ADMIN — Medication 400 MILLIGRAM(S): at 03:52

## 2019-01-08 RX ADMIN — ONDANSETRON 4 MILLIGRAM(S): 8 TABLET, FILM COATED ORAL at 03:53

## 2019-01-08 RX ADMIN — HYDROMORPHONE HYDROCHLORIDE 0.5 MILLIGRAM(S): 2 INJECTION INTRAMUSCULAR; INTRAVENOUS; SUBCUTANEOUS at 04:30

## 2019-01-08 RX ADMIN — SCOPALAMINE 1 PATCH: 1 PATCH, EXTENDED RELEASE TRANSDERMAL at 18:05

## 2019-01-08 RX ADMIN — SCOPALAMINE 1 PATCH: 1 PATCH, EXTENDED RELEASE TRANSDERMAL at 07:31

## 2019-01-08 RX ADMIN — HYDROMORPHONE HYDROCHLORIDE 0.5 MILLIGRAM(S): 2 INJECTION INTRAMUSCULAR; INTRAVENOUS; SUBCUTANEOUS at 15:52

## 2019-01-08 RX ADMIN — ENOXAPARIN SODIUM 40 MILLIGRAM(S): 100 INJECTION SUBCUTANEOUS at 21:12

## 2019-01-08 RX ADMIN — Medication 400 MILLIGRAM(S): at 21:12

## 2019-01-08 NOTE — PHARMACOTHERAPY INTERVENTION NOTE - COMMENTS
Patient was alert and oriented.  She wasn't sure if new propranolol script was sent to home pharmacy.  Needs to change propranolol ER to IR 40 mg TID to be able to crush.  Spoke about discharge medications: liquid APAP, Percocet, Omeprazole, Zofran ODT, Vitamins.  Patient said she was taking vitamins before the surgery, but we told her to not take vitamins until after follow up appt. with Dr in a week.  Also she cannot take tylenol with percocet, wait at least 4 hours in between to take one or the other.  She can dissolve the Zofran ODT under the tongue and she doesn't have to wait until she gets really nauseous, can take as soon as she's feeling upset.  She can open the Omeprazole capsule and mix with apple sauce/ juice, but do not chew the pellets.  She should stick to one ounce of liquid a day because the Dr/ recommended the limit, but she needs to keep hydrated so that she does not get constipated.  If she gets constipated she can take colace or miralax.  She also cannot take NSAIDs like aleve, advil, or aspirin. Also patient does not need to take calcium or iron separately because it is in the fusion vitamins.  If she does take iron or calcium separate, she should take calcium citrate not carbonate and she should take ferrous fumerate not ferrous sulfate.

## 2019-01-08 NOTE — PROGRESS NOTE ADULT - ATTENDING COMMENTS
Agree with above.  Patient seen and examined.  Tolerating clears, good urine output, ambulating.  Probable discharge home later today.  Follow up in the office next week, call with any concerns.

## 2019-01-09 VITALS
HEART RATE: 66 BPM | OXYGEN SATURATION: 97 % | RESPIRATION RATE: 18 BRPM | SYSTOLIC BLOOD PRESSURE: 151 MMHG | DIASTOLIC BLOOD PRESSURE: 97 MMHG | TEMPERATURE: 98 F

## 2019-01-09 LAB — SURGICAL PATHOLOGY STUDY: SIGNIFICANT CHANGE UP

## 2019-01-09 PROCEDURE — 80048 BASIC METABOLIC PNL TOTAL CA: CPT

## 2019-01-09 PROCEDURE — 85027 COMPLETE CBC AUTOMATED: CPT

## 2019-01-09 PROCEDURE — 36415 COLL VENOUS BLD VENIPUNCTURE: CPT

## 2019-01-09 PROCEDURE — 94664 DEMO&/EVAL PT USE INHALER: CPT

## 2019-01-09 PROCEDURE — C1889: CPT

## 2019-01-09 PROCEDURE — 81025 URINE PREGNANCY TEST: CPT

## 2019-01-09 RX ORDER — PROPRANOLOL HCL 160 MG
3 CAPSULE, EXTENDED RELEASE 24HR ORAL
Qty: 360 | Refills: 0 | OUTPATIENT
Start: 2019-01-09 | End: 2019-02-07

## 2019-01-09 RX ORDER — PROPRANOLOL HCL 160 MG
1 CAPSULE, EXTENDED RELEASE 24HR ORAL
Qty: 90 | Refills: 0
Start: 2019-01-09 | End: 2019-02-07

## 2019-01-09 RX ORDER — PROPRANOLOL HCL 160 MG
1 CAPSULE, EXTENDED RELEASE 24HR ORAL
Qty: 0 | Refills: 0 | COMMUNITY

## 2019-01-09 RX ADMIN — PANTOPRAZOLE SODIUM 40 MILLIGRAM(S): 20 TABLET, DELAYED RELEASE ORAL at 12:52

## 2019-01-09 RX ADMIN — Medication 1000 MILLIGRAM(S): at 03:45

## 2019-01-09 RX ADMIN — Medication 400 MILLIGRAM(S): at 03:15

## 2019-01-09 RX ADMIN — ONDANSETRON 4 MILLIGRAM(S): 8 TABLET, FILM COATED ORAL at 05:44

## 2019-01-09 RX ADMIN — SODIUM CHLORIDE 150 MILLILITER(S): 9 INJECTION, SOLUTION INTRAVENOUS at 09:47

## 2019-01-09 RX ADMIN — ENOXAPARIN SODIUM 40 MILLIGRAM(S): 100 INJECTION SUBCUTANEOUS at 09:05

## 2019-01-09 RX ADMIN — ONDANSETRON 4 MILLIGRAM(S): 8 TABLET, FILM COATED ORAL at 12:52

## 2019-01-09 NOTE — PROGRESS NOTE ADULT - SUBJECTIVE AND OBJECTIVE BOX
BARIATRIC SURGERY     Pre-Op Dx: Morbid obesity/Bariatric Surgery Status.  Procedure: S/P Lap Sleeve Gastrectomy with intra op EGD and Hiatal Hernia Repair.  Surgeon: Ciro MULLER    Subjective:    53 y Female post op day # 2 s/p Lap Sleeve Gastrectomy with intra op EGD and hiatal hernia repair. Minimal incisional discomfort exacerbated with movement . Feeling significantly better since yesterday. Denies any nausea or vomiting. Tolerating bariatric clear diet without any issues.  Ambulating on Floor/ Voided this am./ Utilizing incentive spirometry as instructed.              VITALS:  Vital Signs Last 24 Hrs  T(C): 36.7 (09 Jan 2019 07:39), Max: 37.2 (08 Jan 2019 11:36)  T(F): 98 (09 Jan 2019 07:39), Max: 99 (08 Jan 2019 11:36)  HR: 66 (09 Jan 2019 07:39) (66 - 74)  BP: 151/97 (09 Jan 2019 07:39) (124/80 - 151/97)  BP(mean): --  RR: 18 (09 Jan 2019 07:39) (16 - 18)  SpO2: 97% (09 Jan 2019 07:39) (94% - 97%)                Physical Exam:  General: Alert & Oriented x 3.  NAD, resting comfortably in bed.    Abdominal: Soft - Non tender - No swelling noted. Incision site clean / dry /intact. Normoactive Bowel Sounds.  Extremities: No swelling/ edema / erythema noted bilateral upper / lower extremities.  Neuro: Motor / Sensory function grossly intact bilateral lower/ upper extremities.          Assessment: 53 y  Female Post OP Day # 2 S/P Lap Sleeve Gastrectomy with intra OP EGD and hiatal hernia repair. Pt is hemodynamically stable.    Plan:  Cont GI / DVT prophylaxis.   Dietician consult.   Cont  OOB / ambulation on floor / incentive spirometry.  Cont bariatric clear diet as tolerated .  Discussed and reviewed home meds  and pain medication with patient . Discussed medication that requires crushing.  Discussed with patient converting ER medication to immediate - release - will discuss with pharmacy.   Plan to begin protein shakes at home.  Possibly discharged later today.  Dr. Tanner  saw pt.
PULMONARY/CRITICAL CARE      INTERVAL HPI/OVERNIGHT EVENTS:    53y FemaleHPI: Feels better. No sob. Less abd pain.        PAST MEDICAL & SURGICAL HISTORY:  Obesity (BMI 30-39.9)  Sleep apnea  Hypertension  H/O laparoscopic adjustable gastric bandin  S/P rotator cuff repair: left and right  2015, 2016        ICU Vital Signs Last 24 Hrs  T(C): 36.7 (2019 07:39), Max: 37.2 (2019 11:36)  T(F): 98 (2019 07:39), Max: 99 (2019 11:36)  HR: 66 (2019 07:39) (66 - 74)  BP: 151/97 (2019 07:39) (124/80 - 151/97)  BP(mean): --  ABP: --  ABP(mean): --  RR: 18 (2019 07:39) (16 - 18)  SpO2: 97% (2019 07:39) (94% - 97%)    Qtts:     I&O's Summary    2019 07:01  -  2019 07:00  --------------------------------------------------------  IN: 3140 mL / OUT: 3600 mL / NET: -460 mL            REVIEW OF SYSTEMS:    CONSTITUTIONAL: No fever, weight loss, or fatigue  EYES: No eye pain, visual disturbances, or discharge  ENMT:  No difficulty hearing, tinnitus, vertigo; No sinus or throat pain  NECK: No pain or stiffness  BREASTS: No pain, masses, or nipple discharge  RESPIRATORY: No cough, wheezing, chills or hemoptysis; No shortness of breath  CARDIOVASCULAR: No chest pain, palpitations, dizziness, or leg swelling  GASTROINTESTINAL: mild abdominal  pain. No nausea, vomiting, or hematemesis; No diarrhea or constipation. No melena or hematochezia.      GENERAL: NAD, well-groomed, well-developed, NAD  HEAD:  Atraumatic, Normocephalic  EYES: EOMI, PERRLA, conjunctiva and sclera clear  ENMT: No tonsillar erythema, exudates, or enlargement; Moist mucous membranes, Good dentition, No lesions  NECK: Supple, No JVD, Normal thyroid  NERVOUS SYSTEM:  Alert & Oriented X3, Good concentration; Motor Strength 5/5 B/L upper and lower extremities  CHEST/LUNG: Clear to percussion bilaterally; No rales, rhonchi, wheezing, or rubs  HEART: Regular rate and rhythm; No murmurs, rubs, or gallops  ABDOMEN: Soft, mildly tender, Nondistended; Bowel sounds present  EXTREMITIES:  2+ Peripheral Pulses, No clubbing, cyanosis, or edema  LYMPH: No lymphadenopathy noted  SKIN: No rashes or lesions        LABS:                        13.5   10.43 )-----------( 317      ( 2019 07:13 )             40.4     01-08    137  |  105  |  9   ----------------------------<  99  4.3   |  26  |  0.86    Ca    11.0<H>      2019 07:13            vanco through     RADIOLOGY & ADDITIONAL STUDIES:      CRITICAL CARE TIME SPENT:
BARIATRIC SURGERY     Pre-Op Dx: Morbid obesity/Bariatric Surgery Status.  Procedure: S/P Lap Sleeve Gastrectomy with intra op EGD and Hiatal Hernia Repair.  Surgeon: Ciro MULLER    Subjective:    53 y Female post op day # 1 s/p Lap Sleeve Gastrectomy with intra op EGD and hiatal hernia repair. Minimal / moderate incisional discomfort exacerbated with movement . Denies any nausea or vomiting. Patient has started bariatric clear diet this am and tolerating without any issues.  Ambulating on Floor/ Voided this am./ Utilizing incentive spirometry as instructed.     LABS:                        13.5   10.43 )-----------( 317      ( 08 Jan 2019 07:13 )             40.4     01-08    137  |  105  |  9   ----------------------------<  99  4.3   |  26  |  0.86    Ca    11.0<H>      08 Jan 2019 07:13                Vital Signs Last 24 Hrs  T(C): 37.1 (07 Jan 2019 23:30), Max: 37.1 (07 Jan 2019 23:30)  T(F): 98.7 (07 Jan 2019 23:30), Max: 98.7 (07 Jan 2019 23:30)  HR: 72 (07 Jan 2019 23:30) (67 - 79)  BP: 135/89 (07 Jan 2019 23:30) (53/93 - 165/104)  BP(mean): --  RR: 16 (07 Jan 2019 23:30) (7 - 18)  SpO2: 95% (07 Jan 2019 23:30) (94% - 100%)    01-07 @ 07:01  -  01-08 @ 07:00  --------------------------------------------------------  IN: 4500 mL / OUT: 2250 mL / NET: 2250 mL        Physical Exam:  General: Alert & Oriented x 3.  NAD, resting comfortably in bed.    Abdominal: Soft - Non tender - No swelling noted. Incision site clean / dry /intact. Normoactive Bowel Sounds.  Extremities: No swelling/ edema / erythema noted bilateral upper / lower extremities.  Neuro: Motor / Sensory function grossly intact bilateral lower/ upper extremities.          Assessment: 53 y  Female Post OP Day # 1 S/P Lap Sleeve Gastrectomy with intra OP EGD and hiatal hernia repair. Pt is hemodynamically stable.    Plan:  Cont GI / DVT prophylaxis.   Dietician consult.   Cont  OOB / ambulation on floor / incentive spirometry.  Cont bariatric clear diet as tolerated .  Discussed and reviewed home meds  and pain medication with patient . Discussed medication that requires crushing.  Plan to begin protein shakes at home.  Possibly discharged later today or tomorrow.  Dr. Tanner  saw pt.
PULMONARY/CRITICAL CARE      INTERVAL HPI/OVERNIGHT EVENTS:    53y FemaleHPI:  Abdominal discomfort, gas pains chest. Ambulated. No sob. No fever.      PAST MEDICAL & SURGICAL HISTORY:  Obesity (BMI 30-39.9)  Sleep apnea  Hypertension  H/O laparoscopic adjustable gastric bandin  S/P rotator cuff repair: left and right  2015, 2016        ICU Vital Signs Last 24 Hrs  T(C): 36.8 (2019 07:39), Max: 37.1 (2019 23:30)  T(F): 98.2 (2019 07:39), Max: 98.7 (2019 23:30)  HR: 69 (2019 07:39) (67 - 79)  BP: 158/98 (2019 07:39) (53/93 - 165/104)  BP(mean): --  ABP: --  ABP(mean): --  RR: 16 (2019 07:39) (7 - 16)  SpO2: 98% (2019 07:39) (94% - 99%)    Qtts:     I&O's Summary    2019 07:01  -  2019 07:00  --------------------------------------------------------  IN: 4500 mL / OUT: 2250 mL / NET: 2250 mL            REVIEW OF SYSTEMS:    CONSTITUTIONAL: No fever, weight loss, or fatigue  EYES: No eye pain, visual disturbances, or discharge  ENMT:  No difficulty hearing, tinnitus, vertigo; No sinus or throat pain  NECK: No pain or stiffness  BREASTS: No pain, masses, or nipple discharge  RESPIRATORY: No cough, wheezing, chills or hemoptysis; No shortness of breath  CARDIOVASCULAR: No chest pain, palpitations, dizziness, or leg swelling  GASTROINTESTINAL: mild  abdominal pain. No nausea, vomiting, or hematemesis; No diarrhea or constipation. No melena or hematochezia.  GENITOURINARY: No dysuria, frequency, hematuria, or incontinence  NEUROLOGICAL: No headaches, memory loss, loss of strength, numbness, or tremors  SKIN: No itching, burning, rashes, or lesions   LYMPH NODES: No enlarged glands  ENDOCRINE: No heat or cold intolerance; No hair loss  MUSCULOSKELETAL: No joint pain or swelling; No muscle, back, or extremity pain, no calf tenderness  PSYCHIATRIC: No depression, anxiety, mood swings, or difficulty sleeping  HEME/LYMPH: No easy bruising, or bleeding gums  ALLERGY AND IMMUNOLOGIC: No hives or eczema      PHYSICAL EXAM:    GENERAL: NAD, well-groomed, well-developed, NAD  HEAD:  Atraumatic, Normocephalic  EYES: EOMI, PERRLA, conjunctiva and sclera clear  ENMT: No tonsillar erythema, exudates, or enlargement; Moist mucous membranes, Good dentition, No lesions  NECK: Supple, No JVD, Normal thyroid  NERVOUS SYSTEM:  Alert & Oriented X3, Good concentration; Motor Strength 5/5 B/L upper and lower extremities  CHEST/LUNG: Clear to percussion bilaterally; No rales, rhonchi, wheezing, or rubs  HEART: Regular rate and rhythm; No murmurs, rubs, or gallops  ABDOMEN: Soft, mildly tender, Nondistended; Bowel sounds decreased  EXTREMITIES:  2+ Peripheral Pulses, No clubbing, cyanosis, or edema  LYMPH: No lymphadenopathy noted  SKIN: No rashes or lesions        LABS:                        13.5   10.43 )-----------( 317      ( 2019 07:13 )             40.4     01-08    137  |  105  |  9   ----------------------------<  99  4.3   |  26  |  0.86    Ca    11.0<H>      2019 07:13            vanco through     RADIOLOGY & ADDITIONAL STUDIES:      CRITICAL CARE TIME SPENT:
pt awake and alert   some abdominal discomfort   scant flatus  VSS  abd soft, obese. mild tender. bs scant          cv s1s2         chest air entry         ext no edema  labs reviewed    a/p - 53 y Female post op day # 1 s/p Lap Sleeve Gastrectomy with intra op EGD and hiatal hernia repair.   cont w/ abmulation/spirometer     acid suppression daily w/ prn anti-emetic     bariatric clears, to be advanced per surgery     d/c plan

## 2019-01-09 NOTE — PROGRESS NOTE ADULT - ATTENDING COMMENTS
Agree with above. Patient stayed overnight as she was not sure that she was tolerating enough liquids.  Patient seen and examined.  Drinking much more comfortably today. Tolerating clears, good urine output, ambulating.  Probable discharge home later today.  Follow up in the office next week, call with any concerns.

## 2019-01-09 NOTE — PROGRESS NOTE ADULT - ASSESSMENT
Pt stable postop gastric sleeve.  Doing better today with less pain  Hx ELISABETH intolerant of cpap  Advised to see me in office after dc.
Pt stable postop gastric sleeve.  Hx ELISABETH intolerant of cpap  Advised to see me in office after dc.

## 2019-01-10 ENCOUNTER — MESSAGE (OUTPATIENT)
Age: 54
End: 2019-01-10

## 2019-01-16 ENCOUNTER — APPOINTMENT (OUTPATIENT)
Dept: BARIATRICS | Facility: CLINIC | Age: 54
End: 2019-01-16
Payer: COMMERCIAL

## 2019-01-16 VITALS
BODY MASS INDEX: 37.12 KG/M2 | DIASTOLIC BLOOD PRESSURE: 80 MMHG | SYSTOLIC BLOOD PRESSURE: 118 MMHG | HEIGHT: 66 IN | OXYGEN SATURATION: 96 % | HEART RATE: 79 BPM | WEIGHT: 231 LBS

## 2019-01-16 PROCEDURE — 99024 POSTOP FOLLOW-UP VISIT: CPT

## 2019-01-16 NOTE — REASON FOR VISIT
[Post Operative Visit] : a post operative visit for [Morbid Obesity (BMI<40)] : morbid obesity (bmi<40) [S/P Bariatric Surgery] : s/p bariatric surgery

## 2019-01-16 NOTE — ASSESSMENT
[FreeTextEntry1] : 53 year old F 1 WEEK s/p lap sleeve gastrectomy and intra- op EGD and hiatal hernia repair. Weight loss since last visit.\par \par Will start multivitamins and continue protein and liquid intake as instructed.\par Call for any questions or concerns.\par Pt will continue IR beta blocker and convert to extended release in 3 weeks. \par \par Discussed and reviewed constipation remedies with patient. \par \par Nutritional counseling has been provided. The patient is encouraged to remain calorie conscious and continue a low fat, low carbohydrate, protein focus diet. Pt encouraged to participate in a daily exercise regimen incorporating cardio and  strength training. \par \par Return to office in 4 weeks or earlier if any concerns.\par

## 2019-01-16 NOTE — PHYSICAL EXAM
[Obese, well nourished, in no acute distress] : obese, well nourished, in no acute distress [Normal] : affect appropriate [de-identified] : MINIMAL INCISIONAL DISCOMFORT UPON PALPATION. NO ERYTHEMA NO SWELLING NOTED. INCISION SITES INTACT AND HEALING WELL.

## 2019-01-16 NOTE — REVIEW OF SYSTEMS
[Fever] : no fever [Chills] : no chills [Recent Change In Weight] : ~T recent weight change [Dysphagia] : no dysphagia [Chest Pain] : no chest pain [Palpitations] : no palpitations [Shortness Of Breath] : no shortness of breath [Wheezing] : no wheezing [Cough] : no cough [SOB on Exertion] : no shortness of breath during exertion [Abdominal Pain] : abdominal pain [Vomiting] : no vomiting [Constipation] : constipation [Diarrhea] : no diarrhea [Reflux/Heartburn] : no reflux/ heartburn [Negative] : Endocrine [FreeTextEntry2] : weight loss since surgery. [FreeTextEntry7] : left sided incisional discomfort.  constipation since surgery.

## 2019-01-16 NOTE — HISTORY OF PRESENT ILLNESS
[de-identified] : 53 year old F 1 WEEK s/p lap sleeve gastrectomy and intra- op EGD and hiatal hernia repair. Weight loss since last visit.Reports minimal left sided incisional discomfort.Denies any nausea. Tolerating protein shakes without any issues. Pt states he is consuming a sufficient amount of zero calorie liquid and protein per day. Consuming ~ 60 oz of fluid per day. Plan to start taking MVI daily. Pt states she has not had a  BM since surgery. Pt states she is passing gas. No reflux symptoms. Exercising regularly at home. Plan to start strength training at 6 weeks post op.  [Procedure: ___] : Procedure performed: [unfilled]  [Date of Surgery: ___] : Date of Surgery:   [unfilled] [Surgeon Name:   ___] : Surgeon Name: Dr. HERRERA [Pre-Op Weight ___] : Pre-op weight was [unfilled] lbs [de-identified] : LAP BAND SURGERY APS     DOS 2008    DR ORTEGA\par LAP SLEEVE GASTRECTOMY WITH INTRA OP EGD AND HIATAL HERNIA REPAIR          DOS;1/7/2019            DR WAGONER    PRE OP WEIGHT - 240 LBS.

## 2019-02-07 ENCOUNTER — APPOINTMENT (OUTPATIENT)
Dept: BARIATRICS | Facility: CLINIC | Age: 54
End: 2019-02-07
Payer: COMMERCIAL

## 2019-02-07 VITALS
SYSTOLIC BLOOD PRESSURE: 130 MMHG | WEIGHT: 222.66 LBS | HEIGHT: 66 IN | DIASTOLIC BLOOD PRESSURE: 80 MMHG | OXYGEN SATURATION: 99 % | HEART RATE: 70 BPM | BODY MASS INDEX: 35.79 KG/M2

## 2019-02-07 DIAGNOSIS — Z01.818 ENCOUNTER FOR OTHER PREPROCEDURAL EXAMINATION: ICD-10-CM

## 2019-02-07 PROBLEM — I10 ESSENTIAL (PRIMARY) HYPERTENSION: Chronic | Status: ACTIVE | Noted: 2018-12-21

## 2019-02-07 PROBLEM — E66.9 OBESITY, UNSPECIFIED: Chronic | Status: ACTIVE | Noted: 2018-12-21

## 2019-02-07 PROCEDURE — 99024 POSTOP FOLLOW-UP VISIT: CPT

## 2019-02-07 RX ORDER — OXYCODONE AND ACETAMINOPHEN 5; 325 MG/1; MG/1
5-325 TABLET ORAL EVERY 6 HOURS
Qty: 12 | Refills: 0 | Status: DISCONTINUED | COMMUNITY
Start: 2018-12-31 | End: 2019-02-07

## 2019-02-07 RX ORDER — ONDANSETRON 4 MG/1
4 TABLET, ORALLY DISINTEGRATING ORAL 4 TIMES DAILY
Qty: 15 | Refills: 0 | Status: DISCONTINUED | COMMUNITY
Start: 2018-12-31 | End: 2019-02-07

## 2019-02-07 RX ORDER — OMEPRAZOLE 20 MG/1
20 CAPSULE, DELAYED RELEASE ORAL DAILY
Qty: 30 | Refills: 2 | Status: COMPLETED | COMMUNITY
Start: 2018-12-31 | End: 2019-02-07

## 2019-02-07 NOTE — ASSESSMENT
[FreeTextEntry1] : 53 year old female with history of lap band and removal of lap band now one month s/p laparoscopic sleeve gastrectomy with hiatal hernia repair presents for post operative visit.  She is doing well and continues to lose weight. Incisions are healing appropriately. The patient was encouraged to continue a pureed/soft low fat, low carbohydrate and high protein diet for another 2 weeks and then progress to regular foods as tolerated.  Patient was advised to increase ambulation and not to do any heavy lifting until 6 weeks post op.\par \par Nutrition and exercise counseling provided.\par Continue Vitamins\par Stop omeprazole\par Follow up in 1 month\par Call with any questions or concerns\par

## 2019-02-07 NOTE — HISTORY OF PRESENT ILLNESS
[Procedure: ___] : Procedure performed: [unfilled]  [Date of Surgery: ___] : Date of Surgery:   [unfilled] [Surgeon Name:   ___] : Surgeon Name: Dr. HERRERA [Pre-Op Weight ___] : Pre-op weight was [unfilled] lbs [de-identified] : 53 year old female with history of lap band and removal of lap band now one month s/p laparoscopic sleeve gastrectomy with hiatal hernia repair presents for post operative visit. She lost 9 lbs since last visit. Patient is consuming the appropriate amount of protein and water daily. She is tolerating advancement of diet to pureed/soft foods.  Patient has regular bowel movements with some constipation, which is relieved with MIralax or prune juice. Patient is taking vitamins as directed and omeprazole daily. She  denies acid reflux symptoms, nausea, vomiting and diarrhea. She is ambulating frequently for exercise.  [de-identified] : LAP BAND SURGERY APS     DOS 2008    DR ORTEGA\par LAP SLEEVE GASTRECTOMY WITH INTRA OP EGD AND HIATAL HERNIA REPAIR          DOS;1/7/2019            DR WAGONER    PRE OP WEIGHT - 240 LBS.

## 2019-02-07 NOTE — REVIEW OF SYSTEMS
[Recent Change In Weight] : ~T recent weight change [Constipation] : constipation [Negative] : Endocrine [Fever] : no fever [Chills] : no chills [Dysphagia] : no dysphagia [Chest Pain] : no chest pain [Palpitations] : no palpitations [Shortness Of Breath] : no shortness of breath [Wheezing] : no wheezing [Cough] : no cough [SOB on Exertion] : no shortness of breath during exertion [Abdominal Pain] : no abdominal pain [Vomiting] : no vomiting [Diarrhea] : no diarrhea [Reflux/Heartburn] : no reflux/ heartburn [FreeTextEntry2] : intentional weight loss

## 2019-02-19 ENCOUNTER — APPOINTMENT (OUTPATIENT)
Dept: BARIATRICS | Facility: CLINIC | Age: 54
End: 2019-02-19
Payer: COMMERCIAL

## 2019-02-19 VITALS
BODY MASS INDEX: 36.33 KG/M2 | SYSTOLIC BLOOD PRESSURE: 120 MMHG | WEIGHT: 226.07 LBS | DIASTOLIC BLOOD PRESSURE: 82 MMHG | HEART RATE: 72 BPM | OXYGEN SATURATION: 98 % | HEIGHT: 66 IN

## 2019-02-19 PROCEDURE — 99024 POSTOP FOLLOW-UP VISIT: CPT

## 2019-02-19 NOTE — HISTORY OF PRESENT ILLNESS
[de-identified] : 54 yo F with history of lap band and removal of lap band now 6 WEEK s/p laparoscopic sleeve gastrectomy with hiatal hernia repair presents for follow up visit. Weight gain since last visit.Pt state she descending a stairwell this am and fell down towards from 2 steps to ground. Pt states she was able to break her fall , however fell on top of her lap top computer and reported some belly discomfort as a result.Denies hitting head. Denies any discoloration / swelling or pain with movement.  Patient is consuming the appropriate amount of protein and water daily. She is tolerating advancement of diet to pureed/soft foods.  Patient continues to complain of mild constipation, which is relieved with MiraLAX or prune juice. Patient is taking vitamins as directed and omeprazole daily. She  denies acid reflux symptoms, nausea, vomiting and diarrhea. She is ambulating frequently for exercise. Plan to start strength training.  [Procedure: ___] : Procedure performed: [unfilled]  [Date of Surgery: ___] : Date of Surgery:   [unfilled] [Surgeon Name:   ___] : Surgeon Name: Dr. HERRERA [Pre-Op Weight ___] : Pre-op weight was [unfilled] lbs [de-identified] : LAP BAND SURGERY APS     DOS 2008    DR ORTEGA\par LAP SLEEVE GASTRECTOMY WITH INTRA OP EGD AND HIATAL HERNIA REPAIR          DOS;1/7/2019            DR WAGONER    PRE OP WEIGHT - 240 LBS.

## 2019-02-19 NOTE — ASSESSMENT
[FreeTextEntry1] : 54 yo F with history of lap band and removal of lap band now 6 WEEK s/p laparoscopic sleeve gastrectomy with hiatal hernia repair presents for follow up visit. Weight gain since last visit. Recent fall this am - after descending stairwell. History of mild constipation. \par \par Nutrition and exercise counseling provided.\par Continue Vitamins.\par Liquid acetaminophen as needed - heat/ cold modalities discussed. \par Discussed and reviewed constipation remedies with patient.\par Follow up next month March 2019.\par Call with any questions or concerns\par

## 2019-02-19 NOTE — PHYSICAL EXAM
[Obese, well nourished, in no acute distress] : obese, well nourished, in no acute distress [Normal] : affect appropriate [de-identified] : normoactive bowel sounds, soft and non tender, no hepatosplenomegaly or masses appreciated.

## 2019-02-19 NOTE — REVIEW OF SYSTEMS
[Fever] : no fever [Chills] : no chills [Recent Change In Weight] : ~T recent weight change [Dysphagia] : no dysphagia [Chest Pain] : no chest pain [Palpitations] : no palpitations [Shortness Of Breath] : no shortness of breath [Wheezing] : no wheezing [Cough] : no cough [SOB on Exertion] : no shortness of breath during exertion [Abdominal Pain] : abdominal pain [Vomiting] : no vomiting [Constipation] : constipation [Diarrhea] : no diarrhea [Reflux/Heartburn] : no reflux/ heartburn [Negative] : Endocrine [FreeTextEntry2] : weight gain  [FreeTextEntry7] : minimal secondary to recent fall this am.

## 2019-03-08 ENCOUNTER — APPOINTMENT (OUTPATIENT)
Dept: BARIATRICS | Facility: CLINIC | Age: 54
End: 2019-03-08
Payer: COMMERCIAL

## 2019-03-08 VITALS
OXYGEN SATURATION: 98 % | HEART RATE: 60 BPM | DIASTOLIC BLOOD PRESSURE: 80 MMHG | BODY MASS INDEX: 35.43 KG/M2 | HEIGHT: 66 IN | WEIGHT: 220.46 LBS | SYSTOLIC BLOOD PRESSURE: 120 MMHG

## 2019-03-08 PROCEDURE — 99024 POSTOP FOLLOW-UP VISIT: CPT

## 2019-03-15 NOTE — ASSESSMENT
[FreeTextEntry1] : 53 year old female with history of lap band and removal of lap band now two months s/p laparoscopic sleeve gastrectomy with hiatal hernia repair presents for follow up visit.  She is doing well and continues to lose weight.  However, patient is discouraged with slow weight loss. She briefly met with nutritionist and it was advised for her to have a low fat, low carbohydrate and high protein diet consisting of three protein rich meals a day.  Incisions healed appropriately..  Patient was advised to increase amount of walking to reach goal of 10,000 steps daily and to incorporate strength training. Given literature on constipation remedies - advised to take Colace twice daily and Benefiber daily, and use Miralax as needed. \par \par Nutrition and exercise counseling provided.\par Continue vitamins\par Nutritionist appointment same day as next follow up\par Follow up in 1 month with LABS and \par Call with any questions or concerns\par

## 2019-03-15 NOTE — REVIEW OF SYSTEMS
[Recent Change In Weight] : ~T recent weight change [Constipation] : constipation [Negative] : Endocrine [Fever] : no fever [Chills] : no chills [Dysphagia] : no dysphagia [Chest Pain] : no chest pain [Palpitations] : no palpitations [Shortness Of Breath] : no shortness of breath [Wheezing] : no wheezing [Cough] : no cough [SOB on Exertion] : no shortness of breath during exertion [Abdominal Pain] : no abdominal pain [Vomiting] : no vomiting [Diarrhea] : no diarrhea [Reflux/Heartburn] : no reflux/ heartburn [FreeTextEntry2] : weight loss

## 2019-03-15 NOTE — PHYSICAL EXAM
[Obese, well nourished, in no acute distress] : obese, well nourished, in no acute distress [Normal] : affect appropriate [de-identified] : soft and nontender, no hernia appreciated. incisions well healed.

## 2019-03-15 NOTE — HISTORY OF PRESENT ILLNESS
[Procedure: ___] : Procedure performed: [unfilled]  [Date of Surgery: ___] : Date of Surgery:   [unfilled] [Surgeon Name:   ___] : Surgeon Name: Dr. HERRERA [Pre-Op Weight ___] : Pre-op weight was [unfilled] lbs [de-identified] : 53 year old female with history of lap band and removal of lap band now two months s/p laparoscopic sleeve gastrectomy with hiatal hernia repair presents for follow up visit. She lost 6 lbs since last visit. Patient reports missing dinner frequently and thus does not consume adequate protein daily. She is consuming the appropriate amount water daily. Patient is tolerating advancement of diet to regular foods. She went to post op nutrition class. She is taking vitamins daily.  She complains of constipation. She denies acid reflux symptoms, nausea, vomiting and diarrhea. For exercise, she is walking frequently. She reports noncompliance with CPAP. \par   [de-identified] : LAP BAND SURGERY APS     DOS 2008    DR ORTEGA\par LAP SLEEVE GASTRECTOMY WITH INTRA OP EGD AND HIATAL HERNIA REPAIR          DOS;1/7/2019            DR WAGONER    PRE OP WEIGHT - 240 LBS.

## 2019-05-07 LAB
25(OH)D3 SERPL-MCNC: 36.7 NG/ML
ALBUMIN SERPL ELPH-MCNC: 4.2 G/DL
ALP BLD-CCNC: 150 U/L
ALT SERPL-CCNC: 17 U/L
ANION GAP SERPL CALC-SCNC: 13 MMOL/L
AST SERPL-CCNC: 17 U/L
BASOPHILS # BLD AUTO: 0.04 K/UL
BASOPHILS NFR BLD AUTO: 0.7 %
BILIRUB SERPL-MCNC: 0.7 MG/DL
BUN SERPL-MCNC: 12 MG/DL
CALCIUM SERPL-MCNC: 11.2 MG/DL
CHLORIDE SERPL-SCNC: 107 MMOL/L
CHOLEST SERPL-MCNC: 207 MG/DL
CHOLEST/HDLC SERPL: 2.7 RATIO
CO2 SERPL-SCNC: 20 MMOL/L
CREAT SERPL-MCNC: 0.84 MG/DL
EOSINOPHIL # BLD AUTO: 0.2 K/UL
EOSINOPHIL NFR BLD AUTO: 3.6 %
FOLATE SERPL-MCNC: 9.4 NG/ML
GLUCOSE SERPL-MCNC: 73 MG/DL
HBA1C MFR BLD HPLC: 5.8 %
HCT VFR BLD CALC: 44.5 %
HDLC SERPL-MCNC: 78 MG/DL
HGB BLD-MCNC: 14.3 G/DL
IMM GRANULOCYTES NFR BLD AUTO: 0.2 %
IRON SATN MFR SERPL: 34 %
IRON SERPL-MCNC: 107 UG/DL
LDLC SERPL CALC-MCNC: 111 MG/DL
LYMPHOCYTES # BLD AUTO: 2.07 K/UL
LYMPHOCYTES NFR BLD AUTO: 36.9 %
MAN DIFF?: NORMAL
MCHC RBC-ENTMCNC: 28.9 PG
MCHC RBC-ENTMCNC: 32.1 GM/DL
MCV RBC AUTO: 89.9 FL
MONOCYTES # BLD AUTO: 0.47 K/UL
MONOCYTES NFR BLD AUTO: 8.4 %
NEUTROPHILS # BLD AUTO: 2.82 K/UL
NEUTROPHILS NFR BLD AUTO: 50.2 %
PLATELET # BLD AUTO: 275 K/UL
POTASSIUM SERPL-SCNC: 4.5 MMOL/L
PROT SERPL-MCNC: 6.6 G/DL
RBC # BLD: 4.95 M/UL
RBC # FLD: 14.6 %
SODIUM SERPL-SCNC: 140 MMOL/L
TIBC SERPL-MCNC: 314 UG/DL
TRIGL SERPL-MCNC: 90 MG/DL
TSH SERPL-ACNC: 1.25 UIU/ML
UIBC SERPL-MCNC: 207 UG/DL
VIT A SERPL-MCNC: 34.5 UG/DL
VIT B1 SERPL-MCNC: 124.1 NMOL/L
VIT B12 SERPL-MCNC: 492 PG/ML
VIT B6 SERPL-MCNC: 10.6 UG/L
WBC # FLD AUTO: 5.61 K/UL
ZINC SERPL-MCNC: 81 UG/DL

## 2019-05-28 ENCOUNTER — APPOINTMENT (OUTPATIENT)
Dept: BARIATRICS | Facility: CLINIC | Age: 54
End: 2019-05-28
Payer: COMMERCIAL

## 2019-05-28 VITALS
DIASTOLIC BLOOD PRESSURE: 90 MMHG | SYSTOLIC BLOOD PRESSURE: 130 MMHG | BODY MASS INDEX: 35.22 KG/M2 | OXYGEN SATURATION: 99 % | WEIGHT: 219.14 LBS | HEART RATE: 73 BPM | HEIGHT: 66 IN

## 2019-05-28 PROCEDURE — 99214 OFFICE O/P EST MOD 30 MIN: CPT

## 2019-05-28 NOTE — REVIEW OF SYSTEMS
[Recent Change In Weight] : ~T recent weight change [Constipation] : constipation [Reflux/Heartburn] : reflux/heartburn [Negative] : Psychiatric [Fever] : no fever [Chills] : no chills [Dysphagia] : no dysphagia [Loss of Hearing] : no loss of hearing [Chest Pain] : no chest pain [Shortness Of Breath] : no shortness of breath [Abdominal Pain] : no abdominal pain [Vomiting] : no vomiting [Diarrhea] : no diarrhea [FreeTextEntry2] : weight loss since last visit.  [FreeTextEntry7] : occasional reflux with tomato sauce.   Mild constipation.

## 2019-05-28 NOTE — HISTORY OF PRESENT ILLNESS
[Procedure: ___] : Procedure performed: [unfilled]  [Date of Surgery: ___] : Date of Surgery:   [unfilled] [Surgeon Name:   ___] : Surgeon Name: Dr. HERRERA [Pre-Op Weight ___] : Pre-op weight was [unfilled] lbs [de-identified] : 54 year old F with history of lap band and removal of lap band - s/p laparoscopic sleeve gastrectomy with hiatal hernia repair presents for follow up visit. Patient lost 1 lbs since last visit. Patient states she is consuming 3 meals a day- consuming adequate protein and liquid since last visit/ not skipping meals since last visit.Most recent labs were performed on 4/12/2019. Requesting to discuss and review recent labs performed. Saw nutritionist on 3/8/2019 to discuss and review nutritional guidelines.  She is taking vitamins daily. Pt continues to complain of constipation. Mild to moderate reflux symptoms exacerbated when consuming spicy foods or tomato sauce. She denies nausea, vomiting and diarrhea. Exercising regularly incorporating cardio and strength training. Pt states she does not use CPAP regularly. \par   [de-identified] : LAP BAND SURGERY APS     DOS 2008    DR ORTEGA\par LAP SLEEVE GASTRECTOMY WITH INTRA OP EGD AND HIATAL HERNIA REPAIR          DOS;1/7/2019            DR WAGONER    PRE OP WEIGHT - 240 LBS.

## 2019-05-28 NOTE — PHYSICAL EXAM
[Obese, well nourished, in no acute distress] : obese, well nourished, in no acute distress [Normal] : affect appropriate [de-identified] : normoactive bowel sounds, soft and non tender, no hepatosplenomegaly or masses appreciated.

## 2019-05-28 NOTE — ASSESSMENT
[FreeTextEntry1] : 54 year old F with history of lap band and removal of lap band - s/p laparoscopic sleeve gastrectomy with hiatal hernia repair presents for follow up visit. Patient lost 1 lbs since last visit\par \par Discussed and reviewed with patient foods to avoid that exacerbate GERD symptoms. Recommended that pt take an OTC H2 blocker or PPI as needed for symptoms. \par Discussed and reviewed constipation remedies/ nutritional guidelines / daily exercise. \par Discussed and  reviewed  recent blood work performed in April 2019. \par Will continue multivitamins and continue protein and liquid intake as instructed.\par Dr. Tanner saw patient. \par Call for any questions or concerns.\par \par Return to office in 8 weeks or earlier if any concerns.\par  \par

## 2019-08-09 ENCOUNTER — APPOINTMENT (OUTPATIENT)
Dept: BARIATRICS | Facility: CLINIC | Age: 54
End: 2019-08-09

## 2020-07-08 NOTE — PATIENT PROFILE ADULT - NSPROEDAREADYLEARN_GEN_A_NUR
Chief Complaint   Patient presents with    Fracture     follow up from left foot fx DOI 5/30/2020       Nestor Kothari is a 50 y.o.   female who presents today  for evaluation of left foot pain. she reports this has been ongoing since 5/30/2020. She has been wbat in boot. Past Medical History:   Diagnosis Date    Eczema     Headache     Migraine     Motion sickness      Past Surgical History:   Procedure Laterality Date    LYMPH NODE BIOPSY         Current Outpatient Medications:     atenolol (TENORMIN) 25 MG tablet, TAKE ONE-HALF TABLET BY MOUTH DAILY, Disp: 30 tablet, Rfl: 2    buPROPion (WELLBUTRIN SR) 200 MG extended release tablet, TAKE ONE TABLET BY MOUTH TWO TIMES A DAY, Disp: 60 tablet, Rfl: 3    butalbital-acetaminophen-caffeine (FIORICET, ESGIC) -40 MG per tablet, Take 1 tablet by mouth every 4 hours as needed for Headaches or Migraine, Disp: 30 tablet, Rfl: 2    cyclobenzaprine (FLEXERIL) 5 MG tablet, Take 1 tablet by mouth 2 times daily as needed for Muscle spasms, Disp: 60 tablet, Rfl: 2    Crisaborole (EUCRISA) 2 % OINT, Apply 1 applicator topically 2 times daily, Disp: 60 g, Rfl: 5    halcinonide (HALOG) 0.1 % CREA, Apply 1 Tube topically daily, Disp: 60 g, Rfl: 5    scopolamine (TRANSDERM-SCOP) transdermal patch, Place 1 patch onto the skin every 72 hours, Disp: 4 patch, Rfl: 2    VIMOVO 500-20 MG TBEC, Take 1 tablet by mouth 2 times daily, Disp: 180 tablet, Rfl: 0    mupirocin (BACTROBAN) 2 % ointment, Apply topically 2 times daily. , Disp: 1 Tube, Rfl: 0    EPINEPHrine (EPIPEN 2-HENRRY) 0.3 MG/0.3ML SOAJ injection, Inject 0.3 mLs into the muscle once for 1 dose Use as directed for allergic reaction, Disp: 0.3 mL, Rfl: 2  Allergies   Allergen Reactions    Bactrim [Sulfamethoxazole-Trimethoprim] Hives    Bee Venom Anaphylaxis    Sulfa Antibiotics Hives     Social History     Socioeconomic History    Marital status:      Spouse name: Not on file    Number of children: Not on file    Years of education: Not on file    Highest education level: Not on file   Occupational History    Not on file   Social Needs    Financial resource strain: Not on file    Food insecurity     Worry: Not on file     Inability: Not on file    Transportation needs     Medical: Not on file     Non-medical: Not on file   Tobacco Use    Smoking status: Never Smoker    Smokeless tobacco: Never Used   Substance and Sexual Activity    Alcohol use: Yes     Comment: rarely    Drug use: No    Sexual activity: Not on file   Lifestyle    Physical activity     Days per week: Not on file     Minutes per session: Not on file    Stress: Not on file   Relationships    Social connections     Talks on phone: Not on file     Gets together: Not on file     Attends Yarsani service: Not on file     Active member of club or organization: Not on file     Attends meetings of clubs or organizations: Not on file     Relationship status: Not on file    Intimate partner violence     Fear of current or ex partner: Not on file     Emotionally abused: Not on file     Physically abused: Not on file     Forced sexual activity: Not on file   Other Topics Concern    Not on file   Social History Narrative    Not on file     Family History   Problem Relation Age of Onset    Cancer Father        REVIEW OF SYSTEMS:     General/Constitution:  (-)weight loss, (-)fever, (-)chills, (-)weakness. Skin: (-) rash,(-) psoriasis,(-) eczema, (-)skin cancer. Musculoskeletal: (-) fractures,  (-) dislocations,(-) collagen vascular disease, (-) fibromyalgia, (-) multiple sclerosis, (-) muscular dystrophy, (-) RSD,(-) joint pain (-)swelling, (-) joint pain,swelling. Neurologic: (-) epilepsy, (-)seizures,(-) brain tumor,(-) TIA, (-)stroke, (-)headaches, (-)Parkinson disease,(-) memory loss, (-) LOC. Cardiovascular: (-) Chest pain, (-) swelling in legs/feet, (-) SOB, (-) cramping in legs/feet with walking.   Respiratory: (-) SOB, (-) Coughing, (-) night sweats. GI: (-) nausea, (-) vomiting, (-) diarrhea, (-) blood in stool, (-) gastric ulcer. Psychiatric: (-) Depression, (-) Anxiety, (-) bipolar disease, (-) Alzheimer's Disease  Allergic/Immunologic: (-) allergies latex, (-) allergies metal, (-) skin sensitivity. Hematlogic: (-) anemia, (-) blood transfusion, (-) DVT/PE, (-) Clotting disorders      EXAM:      Constitution:    Ht 5' 5\" (1.651 m)   Wt 141 lb 15.6 oz (64.4 kg)   BMI 23.63 kg/m²     Psycihatric:    The patient is alert and oriented x 3, appears to be stated age and in no distress. Respiratory:    Respiratory effort is not labored. Patient is not gasping. Palpation of the chest reveals no tactile fremitus. Skin:    Upon inspection: the skin appears warm, dry and intact. There is not a previous scar over the affected area. There is not any cellulitis, lymphedema or cutaneous lesions noted in the lower extremities. Upon palpation there is no induration noted. Neurologic:      Motor exam of the lower extremities show ; quadriceps, hamstrings, foot dorsi and plantar flexors intact R.  5/5 and L. 5/5. Deep tendon reflexes are 2/4 at the knees and 2/4 at the ankles with strong extensor hallicus longus motor strength bilaterally. Sensory to both feet is intact to all sensory roots. Cardiovascular: The vascular exam is normal and is well perfused to distal extremities. Distal pulses DP/PT: R. 2+; L. 2+. There is cap refill noted less than two seconds in all digits. There is not edema of the bilateral lower extremities. There is not varicosities noted in the distal extremities. Lymph:    Upon palpation,  there is no lymphadenopathy noted in bilateral lower extremities. Musculoskeletal:    Gait: antalgic; examination of the nails and digits reveal no cyanosis or clubbing.         Ankle Exam:    Upon inspection and palpation of the Left ankle,  there is not deformity noted,  no swelling, no ecchymosis, does not have pain on palpation of ankle. ROM R/L : DF 15/15; PF 35/35;  INV 15/15, FER 15/15. This exam was compared bilaterally. Right Ankle:   (-) Anterior Drawer ,  (-) Posterior Drawer ,  (-) Squeeze test,  (-) External Rotation, (-) Eversion test , (-) Lebron Test     Left ankle:   (-) Anterior Drawer ,(-)  Posterior Drawer ,(-) Squeeze test,(-) External Rotation (-) Eversion test, (-) Lebron Test.      Foot exam- visual inspection reveals warm, good capillary refill, there is limited pain to palpation over the base of 5th metatarsal.   ROM inversion/eversion full range of motion, abduction/adduction full range of motion, ROM in MTP/PIP/DIP full range of motion. Xrays:  Stable transverse fracture proximal 5th metatarsal with healing noted  Radiographic findings reviewed with patient    Impression:  No diagnosis found. Plan:Natural history and expected course discussed. Questions answered. Rest, ice, compression, elevation (RICE) therapy. Crutches and instructions provided. Educational materials distributed.    WBAT  DC boot  FU in 2 months with xr none

## 2020-09-30 NOTE — PATIENT PROFILE ADULT - OVER THE PAST TWO WEEKS HAVE YOU FELT DOWN, DEPRESSED OR HOPELESS?
Patient verified .  Reminder call for stress test with instructions given.  Patient verbalized understanding.   
no

## 2021-08-26 ENCOUNTER — APPOINTMENT (OUTPATIENT)
Dept: SURGERY | Facility: CLINIC | Age: 56
End: 2021-08-26
Payer: COMMERCIAL

## 2021-08-26 VITALS
DIASTOLIC BLOOD PRESSURE: 88 MMHG | HEIGHT: 67 IN | BODY MASS INDEX: 35 KG/M2 | WEIGHT: 223 LBS | HEART RATE: 74 BPM | SYSTOLIC BLOOD PRESSURE: 134 MMHG

## 2021-08-26 PROCEDURE — 99204 OFFICE O/P NEW MOD 45 MIN: CPT

## 2021-08-26 RX ORDER — PROPRANOLOL HYDROCHLORIDE 40 MG/1
40 TABLET ORAL
Refills: 0 | Status: COMPLETED | COMMUNITY
End: 2021-08-26

## 2021-08-29 NOTE — HISTORY OF PRESENT ILLNESS
[de-identified] : Pt c/o hypercalcemia since 2018.   had recent ankle fracture without trauma.  notes constipation and fatigue.  denies kidney stones, dysphagia, hoarseness or RT Exposure. \par Ionized calcium 6.5,  serum calcium 11.,5,  .3,   Vitamin  D 20,   24 Hr Urinary  Ca 231\par sonogram:  Left 3 mm thyroid nodule and 8 mm parathyroid\par bone density shows osteoporosis\par I have reviewed all old and new data and available images.

## 2021-08-29 NOTE — ASSESSMENT
[FreeTextEntry1] : lengthy discussion regarding options for management. in view of labs and symptoms have recommended minimally invasive parathyroidectomy with PTH assay.  will require preop 4D CT .  risks, benefits and alternatives discussed at length.  I have discussed with the patient the anatomy of the area, the pathophysiology of the disease process and the rationale for surgery.  The attendant risks, possible complications and expected postoperative course have been discussed in detail.  I have given the patient the opportunity to ask questions, and all questions have been answered to the patient's satisfaction, and they wish to proceed with the planned procedure.  to be scheduled ambulatory at [facility]

## 2021-08-29 NOTE — PHYSICAL EXAM
[de-identified] : no palpable thyroid nodules [Midline] : located in midline position [Laryngoscopy Performed] : laryngoscopy was performed, see procedure section for findings [Normal] : orientation to person, place, and time: normal [de-identified] : indirect  laryngoscopy shows normal vocal cord mobility bilaterally with no lesions noted

## 2021-08-29 NOTE — CONSULT LETTER
[Dear  ___] : Dear  [unfilled], [Consult Letter:] : I had the pleasure of evaluating your patient, [unfilled]. [Please see my note below.] : Please see my note below. [Consult Closing:] : Thank you very much for allowing me to participate in the care of this patient.  If you have any questions, please do not hesitate to contact me. [Sincerely,] : Sincerely, [FreeTextEntry2] : Dr. Eugenia Kaur, Dr. Hafsa Joshua [DrDarrel  ___] : Dr. HERRERA [FreeTextEntry3] : Vamshi Travis MD, FACS\par System Director, Endocrine Surgery\par [facility]\par Assistant Clinical Professor of Surgery\par [facility] [employer] [facility]

## 2021-08-31 ENCOUNTER — OUTPATIENT (OUTPATIENT)
Dept: OUTPATIENT SERVICES | Facility: HOSPITAL | Age: 56
LOS: 1 days | End: 2021-08-31
Payer: COMMERCIAL

## 2021-08-31 ENCOUNTER — APPOINTMENT (OUTPATIENT)
Dept: CT IMAGING | Facility: IMAGING CENTER | Age: 56
End: 2021-08-31
Payer: COMMERCIAL

## 2021-08-31 DIAGNOSIS — Z98.84 BARIATRIC SURGERY STATUS: Chronic | ICD-10-CM

## 2021-08-31 DIAGNOSIS — Z98.89 OTHER SPECIFIED POSTPROCEDURAL STATES: Chronic | ICD-10-CM

## 2021-08-31 DIAGNOSIS — E21.0 PRIMARY HYPERPARATHYROIDISM: ICD-10-CM

## 2021-08-31 PROCEDURE — 70491 CT SOFT TISSUE NECK W/DYE: CPT | Mod: 26

## 2021-08-31 PROCEDURE — 70492 CT SFT TSUE NCK W/O & W/DYE: CPT

## 2021-08-31 PROCEDURE — 82565 ASSAY OF CREATININE: CPT

## 2021-09-10 ENCOUNTER — NON-APPOINTMENT (OUTPATIENT)
Age: 56
End: 2021-09-10

## 2021-10-22 ENCOUNTER — OUTPATIENT (OUTPATIENT)
Dept: OUTPATIENT SERVICES | Facility: HOSPITAL | Age: 56
LOS: 1 days | End: 2021-10-22
Payer: COMMERCIAL

## 2021-10-22 VITALS
HEART RATE: 63 BPM | OXYGEN SATURATION: 98 % | SYSTOLIC BLOOD PRESSURE: 138 MMHG | HEIGHT: 66 IN | DIASTOLIC BLOOD PRESSURE: 80 MMHG | RESPIRATION RATE: 18 BRPM | TEMPERATURE: 98 F | WEIGHT: 225.09 LBS

## 2021-10-22 DIAGNOSIS — E21.3 HYPERPARATHYROIDISM, UNSPECIFIED: ICD-10-CM

## 2021-10-22 DIAGNOSIS — R20.0 ANESTHESIA OF SKIN: ICD-10-CM

## 2021-10-22 DIAGNOSIS — E21.0 PRIMARY HYPERPARATHYROIDISM: ICD-10-CM

## 2021-10-22 DIAGNOSIS — Z98.89 OTHER SPECIFIED POSTPROCEDURAL STATES: Chronic | ICD-10-CM

## 2021-10-22 DIAGNOSIS — Z98.84 BARIATRIC SURGERY STATUS: Chronic | ICD-10-CM

## 2021-10-22 DIAGNOSIS — G47.30 SLEEP APNEA, UNSPECIFIED: ICD-10-CM

## 2021-10-22 LAB
ANION GAP SERPL CALC-SCNC: 13 MMOL/L — SIGNIFICANT CHANGE UP (ref 7–14)
BUN SERPL-MCNC: 10 MG/DL — SIGNIFICANT CHANGE UP (ref 7–23)
CALCIUM SERPL-MCNC: 10.9 MG/DL — HIGH (ref 8.4–10.5)
CHLORIDE SERPL-SCNC: 105 MMOL/L — SIGNIFICANT CHANGE UP (ref 98–107)
CO2 SERPL-SCNC: 24 MMOL/L — SIGNIFICANT CHANGE UP (ref 22–31)
CREAT SERPL-MCNC: 0.78 MG/DL — SIGNIFICANT CHANGE UP (ref 0.5–1.3)
GLUCOSE SERPL-MCNC: 87 MG/DL — SIGNIFICANT CHANGE UP (ref 70–99)
HCT VFR BLD CALC: 41.9 % — SIGNIFICANT CHANGE UP (ref 34.5–45)
HGB BLD-MCNC: 13.1 G/DL — SIGNIFICANT CHANGE UP (ref 11.5–15.5)
MCHC RBC-ENTMCNC: 30.4 PG — SIGNIFICANT CHANGE UP (ref 27–34)
MCHC RBC-ENTMCNC: 31.3 GM/DL — LOW (ref 32–36)
MCV RBC AUTO: 97.2 FL — SIGNIFICANT CHANGE UP (ref 80–100)
NRBC # BLD: 0 /100 WBCS — SIGNIFICANT CHANGE UP
NRBC # FLD: 0 K/UL — SIGNIFICANT CHANGE UP
PLATELET # BLD AUTO: 296 K/UL — SIGNIFICANT CHANGE UP (ref 150–400)
POTASSIUM SERPL-MCNC: 3.9 MMOL/L — SIGNIFICANT CHANGE UP (ref 3.5–5.3)
POTASSIUM SERPL-SCNC: 3.9 MMOL/L — SIGNIFICANT CHANGE UP (ref 3.5–5.3)
RBC # BLD: 4.31 M/UL — SIGNIFICANT CHANGE UP (ref 3.8–5.2)
RBC # FLD: 13.9 % — SIGNIFICANT CHANGE UP (ref 10.3–14.5)
SODIUM SERPL-SCNC: 142 MMOL/L — SIGNIFICANT CHANGE UP (ref 135–145)
WBC # BLD: 6.33 K/UL — SIGNIFICANT CHANGE UP (ref 3.8–10.5)
WBC # FLD AUTO: 6.33 K/UL — SIGNIFICANT CHANGE UP (ref 3.8–10.5)

## 2021-10-22 PROCEDURE — 93010 ELECTROCARDIOGRAM REPORT: CPT

## 2021-10-22 RX ORDER — OMEPRAZOLE 10 MG/1
1 CAPSULE, DELAYED RELEASE ORAL
Qty: 0 | Refills: 0 | DISCHARGE

## 2021-10-22 RX ORDER — ONDANSETRON 8 MG/1
1 TABLET, FILM COATED ORAL
Qty: 0 | Refills: 0 | DISCHARGE

## 2021-10-22 NOTE — H&P PST ADULT - NSICDXFAMILYHX_GEN_ALL_CORE_FT
FAMILY HISTORY:  Mother  Still living? Unknown  Family history of breast cancer, Age at diagnosis: Age Unknown    Aunt  Still living? Yes, Estimated age: 71-80  Family history of breast cancer, Age at diagnosis: Age Unknown

## 2021-10-22 NOTE — H&P PST ADULT - NEUROLOGICAL COMMENTS
Pt reports she has carpal tunnel syndrome bilateral hands and has numbness tingling fingers bilateral hands intermittently numbness tingling fingers bilateral hands intermittently.  MRI of neck was done at [facility] " a few months ago".  Pt reports she has bilateral carpal tunnel syndome

## 2021-10-22 NOTE — H&P PST ADULT - NSICDXPASTMEDICALHX_GEN_ALL_CORE_FT
PAST MEDICAL HISTORY:  Hyperparathyroidism     Hypertension     Obesity (BMI 30-39.9)     Sleep apnea no CPAP     PAST MEDICAL HISTORY:  History of fall right  ankle fracture, , back pain    Hyperparathyroidism     Hypertension     Obesity (BMI 30-39.9)     S/P lumbar fusion 2/2020    Sleep apnea no CPAP     PAST MEDICAL HISTORY:  H/O carpal tunnel syndrome (B)    History of fall right  ankle fracture, , back pain    Hyperparathyroidism     Hypertension     Obesity (BMI 30-39.9)     S/P lumbar fusion 2/2020    Sleep apnea no CPAP

## 2021-10-22 NOTE — H&P PST ADULT - PROBLEM SELECTOR PLAN 1
Parathyroidectomy with parathyroid hormone assay    CBC BMP EKG    Preop instructions and antibacterial soap given and explained (verbal and written), with teach back.     Pt reports she will see PMD next week for preop eval (comorbidities)

## 2021-10-22 NOTE — H&P PST ADULT - HISTORY OF PRESENT ILLNESS
57 y/o female with elevated calcium level, subsequently dx with hyperparathyroidism on further work-up.  Scheduled for Parathyroidectomy with parathyroid hormone assay

## 2021-10-22 NOTE — H&P PST ADULT - MUSCULOSKELETAL COMMENTS
lower back pain and right ankle pain x of lumbar fusion 2/2020, and right  ankle ORIF brace to tight foot/ ankle

## 2021-10-22 NOTE — H&P PST ADULT - PROBLEM SELECTOR PLAN 3
and neck pain    Pt reports MRI ofneryan mancilla done  "a few mths ago" at [facility], requested on chart.  Dr. Nahum Abraham office informed and neck pain    Pt reports MRI of neck was done  "a few mths ago" at [facility], requested on chart.  Dr. Nahum Abraham office informed

## 2021-10-22 NOTE — H&P PST ADULT - NSICDXPASTSURGICALHX_GEN_ALL_CORE_FT
PAST SURGICAL HISTORY:  H/O laparoscopic adjustable gastric banding 2006    S/P rotator cuff repair left and right  8/2015, 2/2016

## 2021-10-28 PROBLEM — G47.30 SLEEP APNEA, UNSPECIFIED: Chronic | Status: ACTIVE | Noted: 2018-12-21

## 2021-10-28 PROBLEM — Z98.1 ARTHRODESIS STATUS: Chronic | Status: ACTIVE | Noted: 2021-10-22

## 2021-10-28 PROBLEM — Z91.81 HISTORY OF FALLING: Chronic | Status: ACTIVE | Noted: 2021-10-22

## 2021-10-28 PROBLEM — E21.3 HYPERPARATHYROIDISM, UNSPECIFIED: Chronic | Status: ACTIVE | Noted: 2021-10-22

## 2021-10-28 PROBLEM — Z86.69 PERSONAL HISTORY OF OTHER DISEASES OF THE NERVOUS SYSTEM AND SENSE ORGANS: Chronic | Status: ACTIVE | Noted: 2021-10-22

## 2021-11-02 ENCOUNTER — APPOINTMENT (OUTPATIENT)
Dept: DISASTER EMERGENCY | Facility: CLINIC | Age: 56
End: 2021-11-02

## 2021-11-03 LAB — SARS-COV-2 N GENE NPH QL NAA+PROBE: NOT DETECTED

## 2021-11-04 ENCOUNTER — TRANSCRIPTION ENCOUNTER (OUTPATIENT)
Age: 56
End: 2021-11-04

## 2021-11-05 ENCOUNTER — RESULT REVIEW (OUTPATIENT)
Age: 56
End: 2021-11-05

## 2021-11-05 ENCOUNTER — OUTPATIENT (OUTPATIENT)
Dept: OUTPATIENT SERVICES | Facility: HOSPITAL | Age: 56
LOS: 1 days | Discharge: HOME | End: 2021-11-05
Payer: COMMERCIAL

## 2021-11-05 ENCOUNTER — APPOINTMENT (OUTPATIENT)
Dept: SURGERY | Facility: HOSPITAL | Age: 56
End: 2021-11-05

## 2021-11-05 VITALS
RESPIRATION RATE: 12 BRPM | DIASTOLIC BLOOD PRESSURE: 92 MMHG | SYSTOLIC BLOOD PRESSURE: 143 MMHG | HEART RATE: 73 BPM | OXYGEN SATURATION: 94 %

## 2021-11-05 VITALS
WEIGHT: 225.09 LBS | OXYGEN SATURATION: 100 % | DIASTOLIC BLOOD PRESSURE: 96 MMHG | TEMPERATURE: 98 F | HEART RATE: 58 BPM | RESPIRATION RATE: 16 BRPM | SYSTOLIC BLOOD PRESSURE: 147 MMHG | HEIGHT: 66 IN

## 2021-11-05 DIAGNOSIS — Z98.84 BARIATRIC SURGERY STATUS: Chronic | ICD-10-CM

## 2021-11-05 DIAGNOSIS — E21.0 PRIMARY HYPERPARATHYROIDISM: ICD-10-CM

## 2021-11-05 DIAGNOSIS — Z98.89 OTHER SPECIFIED POSTPROCEDURAL STATES: Chronic | ICD-10-CM

## 2021-11-05 LAB
PTH INTACT, INTRAOP SPECIMEN 2: SIGNIFICANT CHANGE UP
PTH INTACT, INTRAOP SPECIMEN 3: SIGNIFICANT CHANGE UP
PTH INTACT, INTRAOP SPECIMEN 4: SIGNIFICANT CHANGE UP
PTH INTACT, INTRAOP SPECIMEN 5: SIGNIFICANT CHANGE UP
PTH INTACT, INTRAOP SPECIMEN 6: SIGNIFICANT CHANGE UP
PTH INTACT, INTRAOP SPECIMEN 7: SIGNIFICANT CHANGE UP
PTH INTACT, INTRAOP TIMING 2: SIGNIFICANT CHANGE UP
PTH INTACT, INTRAOP TIMING 3: SIGNIFICANT CHANGE UP
PTH INTACT, INTRAOP TIMING 4: SIGNIFICANT CHANGE UP
PTH INTACT, INTRAOP TIMING 5: SIGNIFICANT CHANGE UP
PTH INTACT, INTRAOP TIMING 6: SIGNIFICANT CHANGE UP
PTH INTACT, INTRAOP TIMING 7: SIGNIFICANT CHANGE UP
PTH INTACT, INTRAOPERATIVE 2: 164 PG/ML — HIGH (ref 15–65)
PTH INTACT, INTRAOPERATIVE 3: 212 PG/ML — HIGH (ref 15–65)
PTH INTACT, INTRAOPERATIVE 4: 88 PG/ML — HIGH (ref 15–65)
PTH INTACT, INTRAOPERATIVE 5: 63 PG/ML — SIGNIFICANT CHANGE UP (ref 15–65)
PTH INTACT, INTRAOPERATIVE 6: 43 PG/ML — SIGNIFICANT CHANGE UP (ref 15–65)
PTH INTACT, INTRAOPERATIVE 7: 32 PG/ML — SIGNIFICANT CHANGE UP (ref 15–65)
PTH-INTACT IO % DIF SERPL: 148 PG/ML — HIGH (ref 15–65)

## 2021-11-05 PROCEDURE — 60500 EXPLORE PARATHYROID GLANDS: CPT

## 2021-11-05 PROCEDURE — 88331 PATH CONSLTJ SURG 1 BLK 1SPC: CPT | Mod: 26

## 2021-11-05 PROCEDURE — 88305 TISSUE EXAM BY PATHOLOGIST: CPT | Mod: 26

## 2021-11-05 RX ORDER — SODIUM CHLORIDE 9 MG/ML
1000 INJECTION, SOLUTION INTRAVENOUS
Refills: 0 | Status: DISCONTINUED | OUTPATIENT
Start: 2021-11-05 | End: 2021-11-19

## 2021-11-05 RX ORDER — IBUPROFEN 200 MG
1 TABLET ORAL
Qty: 0 | Refills: 0 | DISCHARGE

## 2021-11-05 RX ORDER — GABAPENTIN 400 MG/1
1 CAPSULE ORAL
Qty: 0 | Refills: 0 | DISCHARGE

## 2021-11-05 RX ORDER — ACETAMINOPHEN 500 MG
650 TABLET ORAL EVERY 6 HOURS
Refills: 0 | Status: DISCONTINUED | OUTPATIENT
Start: 2021-11-05 | End: 2021-11-19

## 2021-11-05 RX ORDER — CALCIUM CARBONATE 500(1250)
1 TABLET ORAL
Qty: 0 | Refills: 0 | DISCHARGE
Start: 2021-11-05

## 2021-11-05 RX ORDER — PROPRANOLOL HCL 160 MG
1 CAPSULE, EXTENDED RELEASE 24HR ORAL
Qty: 0 | Refills: 0 | DISCHARGE

## 2021-11-05 RX ORDER — OXYCODONE AND ACETAMINOPHEN 5; 325 MG/1; MG/1
1 TABLET ORAL EVERY 6 HOURS
Refills: 0 | Status: DISCONTINUED | OUTPATIENT
Start: 2021-11-05 | End: 2021-11-05

## 2021-11-05 RX ORDER — BENZOCAINE AND MENTHOL 5; 1 G/100ML; G/100ML
1 LIQUID ORAL
Refills: 0 | Status: DISCONTINUED | OUTPATIENT
Start: 2021-11-05 | End: 2021-11-19

## 2021-11-05 RX ORDER — ACETAMINOPHEN 500 MG
2 TABLET ORAL
Qty: 0 | Refills: 0 | DISCHARGE
Start: 2021-11-05

## 2021-11-05 RX ORDER — CALCIUM CARBONATE 500(1250)
1 TABLET ORAL EVERY 6 HOURS
Refills: 0 | Status: DISCONTINUED | OUTPATIENT
Start: 2021-11-05 | End: 2021-11-19

## 2021-11-05 RX ADMIN — Medication 1 TABLET(S): at 16:32

## 2021-11-05 NOTE — ASU PATIENT PROFILE, ADULT - NS PRO TALK SOMEONE YN
HEMODIALYSIS TREATMENT NOTE    Date: 1/3/2020  Time: 8:44 PM    Data:  Pre Wt: 101.2 kg (223 lb 1.7 oz)   Desired Wt: 100.2 kg   Post Wt: 100.2 kg (220 lb 14.4 oz)  Weight change: 1 kg  Ultrafiltration - Post Run Net Total Removed (mL): 1500 mL  Vascular Access Status: patent  Dialyzer Rinse: Light  Total Blood Volume Processed: 52.3 L Liters  Total Dialysis (Treatment) Time: 3 hours Hours    Lab:       Interventions:  Blood pressure increased as fluid removed.  Able to get 1 KG fluid off in 90 minutes.  Tolerated well, Dressing change CDI.  States less SOB with fluid removal.        Assessment:  Daily UF works well for patient      Plan:    Per Nephrology and Cardiology daily dialysis      no

## 2021-11-05 NOTE — ASU DISCHARGE PLAN (ADULT/PEDIATRIC) - CARE PROVIDER_API CALL
Vamshi Travis)  Plastic Surgery  [address] [place] [address] [address]  [place] [address]  Phone: [phone]  Fax: [phone]  Follow Up Time:

## 2021-11-05 NOTE — ASU PREOP CHECKLIST - WEIGHT IN KG
102.1 - Follow up with Dr. Maciel (cardiologist) on Friday 9/27/19 at 9am.  office located [address] [place] [address], number [phone] ([facility])  - Follow up with your PMD dr. Salazar. - Follow up with Dr. Maciel (cardiologist) on Friday 9/27/19 at 9am.  office located [address] [place] [address], number [phone] ([facility])  - Follow up with your PMD Dr. Salazar on Friday 9/27/19 at 2:20pm.

## 2021-11-05 NOTE — BRIEF OPERATIVE NOTE - NSICDXBRIEFPROCEDURE_GEN_ALL_CORE_FT
PROCEDURES:  Parathyroidectomy, with intraoperative PTH measurement 05-Nov-2021 15:05:58  Cordell Lopez

## 2021-11-05 NOTE — ASU DISCHARGE PLAN (ADULT/PEDIATRIC) - NURSING INSTRUCTIONS
You were given intravenous TYLENOL for pain management at ______2pm_________. Please DO NOT take any products containing TYLENOL or ACETAMINOPHEN, such as VICODIN, PERCOCET, EXCEDRIN, and any over-the-counter cold medication for the next 6 hours (until ____8pm__________). DO NOT TAKE MORE THAN 3000 MG OF TYLENOL in a 24 hour period.

## 2021-11-05 NOTE — BRIEF OPERATIVE NOTE - OPERATION/FINDINGS
Parathyroidectomy with Intraoperative PTH Assay. Linear incision made in the anterior-inferior neck. Right inferior and superior, and left superior parathyroid glands were identified and resected. Primary closure. No drain and minimal EBL.

## 2021-11-09 LAB — SURGICAL PATHOLOGY STUDY: SIGNIFICANT CHANGE UP

## 2021-11-16 ENCOUNTER — APPOINTMENT (OUTPATIENT)
Dept: SURGERY | Facility: CLINIC | Age: 56
End: 2021-11-16
Payer: COMMERCIAL

## 2021-11-16 PROCEDURE — 99024 POSTOP FOLLOW-UP VISIT: CPT

## 2021-11-16 PROCEDURE — 36415 COLL VENOUS BLD VENIPUNCTURE: CPT

## 2021-11-16 NOTE — PHYSICAL EXAM
[de-identified] : well healed scar small papules inferior to scar [Midline] : located in midline position [Normal] : orientation to person, place, and time: normal [de-identified] : Neg Chvostek's sign

## 2021-11-16 NOTE — ASSESSMENT
[FreeTextEntry1] : s/p Parathyroidectomy\par path discussed\par daily care\par blood work\par f/u 3 months\par

## 2021-11-16 NOTE — HISTORY OF PRESENT ILLNESS
[de-identified] : Pt 2 weeks s/p parathyroidectomy doing well without complaints has rash on neck after using bacitracin

## 2021-11-19 ENCOUNTER — NON-APPOINTMENT (OUTPATIENT)
Age: 56
End: 2021-11-19

## 2021-11-19 LAB
25(OH)D3 SERPL-MCNC: 24.6 NG/ML
CALCIUM SERPL-MCNC: 9.8 MG/DL
CALCIUM SERPL-MCNC: 9.8 MG/DL
PARATHYROID HORMONE INTACT: 35 PG/ML

## 2021-11-22 ENCOUNTER — NON-APPOINTMENT (OUTPATIENT)
Age: 56
End: 2021-11-22

## 2022-01-15 NOTE — H&P PST ADULT - FAMILY HISTORY
[FreeTextEntry1] : Labs from hematology reviewed. 
Mother  Still living? Unknown  Family history of breast cancer, Age at diagnosis: Age Unknown     Aunt  Still living? Yes, Estimated age: 71-80  Family history of breast cancer, Age at diagnosis: Age Unknown

## 2022-02-17 ENCOUNTER — APPOINTMENT (OUTPATIENT)
Dept: SURGERY | Facility: CLINIC | Age: 57
End: 2022-02-17
Payer: COMMERCIAL

## 2022-02-17 DIAGNOSIS — E21.0 PRIMARY HYPERPARATHYROIDISM: ICD-10-CM

## 2022-02-17 PROCEDURE — 99213 OFFICE O/P EST LOW 20 MIN: CPT

## 2022-02-17 NOTE — HISTORY OF PRESENT ILLNESS
[de-identified] : Pt 3 months s/p parathyroidectomy had blood work Ca 9.2 from PCP feels well without complaints

## 2022-08-16 ENCOUNTER — APPOINTMENT (OUTPATIENT)
Dept: SURGERY | Facility: CLINIC | Age: 57
End: 2022-08-16

## 2022-09-08 ENCOUNTER — NON-APPOINTMENT (OUTPATIENT)
Age: 57
End: 2022-09-08

## 2022-09-08 ENCOUNTER — APPOINTMENT (OUTPATIENT)
Dept: BARIATRICS | Facility: CLINIC | Age: 57
End: 2022-09-08

## 2022-09-08 VITALS
SYSTOLIC BLOOD PRESSURE: 130 MMHG | TEMPERATURE: 96.7 F | BODY MASS INDEX: 38.69 KG/M2 | WEIGHT: 240.74 LBS | HEIGHT: 66 IN | OXYGEN SATURATION: 98 % | HEART RATE: 71 BPM | DIASTOLIC BLOOD PRESSURE: 78 MMHG

## 2022-09-08 DIAGNOSIS — Z92.29 PERSONAL HISTORY OF OTHER DRUG THERAPY: ICD-10-CM

## 2022-09-08 PROCEDURE — 99203 OFFICE O/P NEW LOW 30 MIN: CPT

## 2022-09-08 PROCEDURE — 99213 OFFICE O/P EST LOW 20 MIN: CPT

## 2022-09-13 LAB
25(OH)D3 SERPL-MCNC: 30.6 NG/ML
ALBUMIN SERPL ELPH-MCNC: 4.4 G/DL
ALP BLD-CCNC: 83 U/L
ALT SERPL-CCNC: 14 U/L
ANION GAP SERPL CALC-SCNC: 12 MMOL/L
APTT BLD: 33.1 SEC
AST SERPL-CCNC: 16 U/L
BASOPHILS # BLD AUTO: 0.04 K/UL
BASOPHILS NFR BLD AUTO: 0.7 %
BILIRUB SERPL-MCNC: 0.6 MG/DL
BUN SERPL-MCNC: 11 MG/DL
CALCIUM SERPL-MCNC: 9.2 MG/DL
CHLORIDE SERPL-SCNC: 108 MMOL/L
CHOLEST SERPL-MCNC: 239 MG/DL
CO2 SERPL-SCNC: 25 MMOL/L
CREAT SERPL-MCNC: 0.77 MG/DL
EGFR: 89 ML/MIN/1.73M2
EOSINOPHIL # BLD AUTO: 0.2 K/UL
EOSINOPHIL NFR BLD AUTO: 3.5 %
ESTIMATED AVERAGE GLUCOSE: 131 MG/DL
FERRITIN SERPL-MCNC: 93 NG/ML
FOLATE SERPL-MCNC: 4.6 NG/ML
GLUCOSE SERPL-MCNC: 100 MG/DL
HBA1C MFR BLD HPLC: 6.2 %
HCG SERPL-MCNC: 1 MIU/ML
HCT VFR BLD CALC: 42.1 %
HDLC SERPL-MCNC: 82 MG/DL
HGB BLD-MCNC: 13.5 G/DL
IMM GRANULOCYTES NFR BLD AUTO: 0.3 %
INR PPP: 1.08 RATIO
IRON SATN MFR SERPL: 29 %
IRON SERPL-MCNC: 96 UG/DL
LDLC SERPL CALC-MCNC: 136 MG/DL
LYMPHOCYTES # BLD AUTO: 1.66 K/UL
LYMPHOCYTES NFR BLD AUTO: 28.9 %
MAN DIFF?: NORMAL
MCHC RBC-ENTMCNC: 29.1 PG
MCHC RBC-ENTMCNC: 32.1 GM/DL
MCV RBC AUTO: 90.7 FL
MONOCYTES # BLD AUTO: 0.37 K/UL
MONOCYTES NFR BLD AUTO: 6.4 %
NEUTROPHILS # BLD AUTO: 3.45 K/UL
NEUTROPHILS NFR BLD AUTO: 60.2 %
NONHDLC SERPL-MCNC: 157 MG/DL
PLATELET # BLD AUTO: 297 K/UL
POTASSIUM SERPL-SCNC: 5 MMOL/L
PROT SERPL-MCNC: 7.1 G/DL
PT BLD: 12.5 SEC
RBC # BLD: 4.64 M/UL
RBC # FLD: 14.2 %
SODIUM SERPL-SCNC: 145 MMOL/L
TIBC SERPL-MCNC: 331 UG/DL
TRIGL SERPL-MCNC: 102 MG/DL
TSH SERPL-ACNC: 1.54 UIU/ML
UIBC SERPL-MCNC: 236 UG/DL
VIT A SERPL-MCNC: 31.3 UG/DL
VIT B12 SERPL-MCNC: 353 PG/ML
WBC # FLD AUTO: 5.74 K/UL
ZINC SERPL-MCNC: 75 UG/DL

## 2022-09-14 LAB — VIT B1 SERPL-MCNC: 115.5 NMOL/L

## 2022-09-15 ENCOUNTER — NON-APPOINTMENT (OUTPATIENT)
Age: 57
End: 2022-09-15

## 2022-10-06 ENCOUNTER — APPOINTMENT (OUTPATIENT)
Dept: BARIATRICS/WEIGHT MGMT | Facility: CLINIC | Age: 57
End: 2022-10-06

## 2022-10-06 VITALS — WEIGHT: 236 LBS | HEIGHT: 66 IN | BODY MASS INDEX: 37.93 KG/M2

## 2022-10-06 PROCEDURE — 97802 MEDICAL NUTRITION INDIV IN: CPT | Mod: 95

## 2022-10-18 NOTE — PHYSICAL EXAM
[Obese, well nourished, in no acute distress] : obese, well nourished, in no acute distress [Normal] : affect appropriate [de-identified] : obese, soft, nontender, no evidence of hernia

## 2022-10-18 NOTE — HISTORY OF PRESENT ILLNESS
[Procedure: ___] : Procedure performed: [unfilled]  [Date of Surgery: ___] : Date of Surgery:   [unfilled] [Surgeon Name:   ___] : Surgeon Name: Dr. HERRERA [Pre-Op Weight ___] : Pre-op weight was [unfilled] lbs [de-identified] : 57-year-old woman 3 years status post laparoscopic sleeve gastrectomy and hiatal hernia repair.  Patient reports gaining weight during the pandemic and after a back injury that left her bedridden for a period of time.  Patient presents today to get back on track.  Preoperative weight was 245 lowest weight 200 and current weight 240.  Patient is skipping meals and may be consuming an adequate protein.  Patient has poor snack choices.  Needs increased fluid intake.  Taking vitamins.  Patient reports discomfort when overeating upon simply once per week.  No reflux or constipation.  Exercise consists of activities of daily living. [de-identified] : LAP BAND SURGERY APS     DOS 2008    DR ORTEGA\par LAP SLEEVE GASTRECTOMY WITH INTRA OP EGD AND HIATAL HERNIA REPAIR          DOS;1/7/2019            DR WAGONER    PRE OP WEIGHT - 240 LBS.

## 2022-10-18 NOTE — ASSESSMENT
[FreeTextEntry1] : 54 year old F with history of lap band and removal of lap band - s/p laparoscopic sleeve gastrectomy with hiatal hernia repair presents for follow up visit.  Patient has not been seen in the office in 3 years gained weight secondary to both the pandemic and immobility due to an injury.  Patient presents today to get back on track is interested in losing more weight.\par \par Nutrition and exercise guidelines were reviewed with the patient stressing the importance of both lifestyle nutritional and behavioral modification for long-term success in both weight loss and weight maintenance.  Greater than 15 minutes was spent.\par \par Continue omeprazole\par Encouraged 3 protein focus meals per day, avoid skipping meals, avoid snacking\par Increase fluid intake 64 ounces per day of zero-calorie liquids\par Continue vitamins\par Increase activity as tolerated\par Follow-up with nutrition\par Follow-up with obesity medicine\par Lab work now\par Follow-up with me in 2 months\par All questions answered\par Call with any questions or concerns\par \par Time spent before and after visit reviewing chart\par \par  \par

## 2022-10-20 NOTE — H&P PST ADULT - RESPIRATORY AND THORAX
Department of Anesthesiology  Preprocedure Note       Name:  Juan Pablo Sarkar   Age:  30 y.o.  :  1992                                          MRN:  [id number]         Date:  10/20/2022      Surgeon: Jcaquelin Becerra):  Margaret Penn MD    Procedure: Procedure(s): HERNIA VENTRAL REPAIR LAPAROSCOPIC POSSIBLE MESH/LAPAROSCOPIC BILATERAL INGUINAL HERNIA REPAIR POSSIBLE MESH  LAPAROSCOPIC BILATERAL INGUINAL HERNIA REPAIR POSSIBLE MESH    Medications prior to admission:   Prior to Admission medications    Medication Sig Start Date End Date Taking? Authorizing Provider   folic acid (FOLVITE) 1 MG tablet Take 1 tablet by mouth once daily 10/19/22   Moody Amos,    Vitamin D, Cholecalciferol, 50 MCG ( UT) CAPS Take 1 capsule by mouth once daily 10/19/22   Starla Lee Catterivone, DO   metoprolol succinate (TOPROL XL) 25 MG extended release tablet Take 0.5 tablets by mouth nightly 10/19/22   Starla Letcher Catfélix, DO   guaiFENesin (MUCINEX) 600 MG extended release tablet Take 1 tablet by mouth 2 times daily for 15 days 10/7/22 10/22/22  Moody Ramos DO   gabapentin (NEURONTIN) 100 MG capsule Take 2 capsules by mouth nightly as needed (restless legs/leg pain) for up to 360 days. 22  ELADIO Rowland CNP   Rimegepant Sulfate 75 MG TBDP Take 75 mg by mouth every other day No more than 1 tab in 24 hours; ok for additional dose on off days PRN breakthrough headache 22   ELADIO Rowland CNP   atorvastatin (LIPITOR) 40 MG tablet Take 1 tablet by mouth in the morning.   Patient taking differently: Take 40 mg by mouth nightly 8/10/22   ELADIO Rowland CNP   pantoprazole (PROTONIX) 40 MG tablet Take 40 mg by mouth daily 22   Historical Provider, MD   albuterol sulfate HFA (VENTOLIN HFA) 108 (90 Base) MCG/ACT inhaler Inhale 2 puffs into the lungs every 6 hours as needed for Wheezing 3/16/22   Moody Ramos DO   apixaban (ELIQUIS) 5 MG TABS tablet Take 5 mg by mouth 2 times daily    Historical Provider, MD   clonazePAM (KLONOPIN) 1 MG tablet Take 1 mg by mouth 2 times daily as needed. Historical Provider, MD   Handicap Placard [address] [facility] by Does not apply route Expires in 1 year 12/10/21   Filiberto Ast Catterlin, DO   diclofenac sodium (VOLTAREN) 1 % GEL Apply 2 g topically 4 times daily 12/10/21   [place] Ast Catterlin, DO   pyridostigmine (MESTINON) 60 MG tablet Take 1 tablet by mouth 3 times daily 8/28/17   [place] Ast Catterlin, DO   citalopram (CELEXA) 20 MG tablet Take 30 mg by mouth daily     Historical Provider, MD       Current medications:    No current facility-administered medications for this visit. No current outpatient medications on file. Facility-Administered Medications Ordered in Other Visits   Medication Dose Route Frequency Provider Last Rate Last Admin    sodium chloride flush 0.9 % injection 5-40 mL  5-40 mL IntraVENous 2 times per day Fany Rahman MD        sodium chloride flush 0.9 % injection 5-40 mL  5-40 mL IntraVENous PRN Fany Rahman MD        0.9 % sodium chloride infusion   IntraVENous PRN Fany Rahman MD        ceFAZolin (ANCEF) 3,000 mg in dextrose 5 % 100 mL IVPB  3,000 mg IntraVENous On Call to Vamsi Vogel MD        0.9 % sodium chloride infusion   IntraVENous Continuous Gregorio Chen MD 10 mL/hr at 10/20/22 0934 New Bag at 10/20/22 0934       Allergies: Allergies   Allergen Reactions    Latex Dermatitis       Problem List:    Patient Active Problem List   Diagnosis Code    POTS (postural orthostatic tachycardia syndrome) G90. 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 109    Insulin controlled gestational diabetes mellitus (GDM) in second trimester O24.414    Type 2 diabetes mellitus without complication, without long-term current use of insulin (HCC) E11.9    Non-recurrent unilateral inguinal hernia without obstruction or gangrene Y37.52    Umbilical hernia without obstruction and without gangrene K42.9    Bilateral inguinal hernia K40.20       Past Medical History:        Diagnosis Date    Anemia     Anxiety     Asthma     Blood clot embolism during pregnancy, antepartum     Cerebrovascular accident (CVA) due to vascular occlusion ([facility]      delivery delivered 2021    Chronic migraine     Coagulopathy ([facility]     Diabetes mellitus ([facility]     gestational - no current issues as of 10-17-22    DVT of leg (deep venous thrombosis) ([facility])     left    DVT of upper extremity (deep vein thrombosis) ([facility])     Left X 2    Factor 5 Leiden mutation, heterozygous ([facility]     Heterozygous for BERT-1 4G allele     Hyperlipidemia     Low back pain     MTHFR mutation     Oligohydramnios in third trimester 2021    POTS (postural orthostatic tachycardia syndrome)     Barnard Chucho , neurology    Shingles     x2 - age 14, and age 27    Thrombophilia during pregnancy in third trimester ([facility] 2021    Thyroid disease        Past Surgical History:        Procedure Laterality Date     SECTION N/A 2021     SECTION performed by Maggi Ortiz MD at [facility]    DILATION AND CURETTAGE OF UTERUS      FRACTURE SURGERY      10    NERVE BLOCK Left 2018    transforaminal epidural #1    DC ZEHRA NOSE/CLEFT LIP/TIP Left 2018    LEFT L5-S1 TRANSFORAMINAL EPIDURAL STEROID INJECTION #1 performed by Alexandra Dumont DO at [place] [address] Right     may       Social History:    Social History     Tobacco Use    Smoking status: Former     Packs/day: 0.50     Types: Cigarettes     Start date:      Quit date: 2021     Years since quittin.3    Smokeless tobacco: Never   Substance Use Topics    Alcohol use: Not Currently                                Counseling given: Not Answered      Vital Signs (Current): There were no vitals filed for this visit. BP Readings from Last 3 Encounters:   10/20/22 (!) 119/59   10/17/22 109/65   10/12/22 125/78       NPO Status: 10/19/22 @2300                                                                               BMI:   Wt Readings from Last 3 Encounters:   10/20/22 275 lb (124.7 kg)   10/17/22 275 lb (124.7 kg)   10/12/22 279 lb (126.6 kg)     There is no height or weight on file to calculate BMI.    CBC:   Lab Results   Component Value Date/Time    WBC 6.2 10/17/2022 10:25 AM    RBC 4.67 10/17/2022 10:25 AM    HGB 13.8 10/17/2022 10:25 AM    HCT 41.7 10/17/2022 10:25 AM    MCV 89.3 10/17/2022 10:25 AM    RDW 12.8 10/17/2022 10:25 AM     10/17/2022 10:25 AM       CMP:   Lab Results   Component Value Date/Time     10/17/2022 10:25 AM    K 4.4 10/17/2022 10:25 AM     10/17/2022 10:25 AM    CO2 22 10/17/2022 10:25 AM    BUN 9 10/17/2022 10:25 AM    CREATININE 0.8 10/17/2022 10:25 AM    GFRAA >60 10/17/2022 10:25 AM    LABGLOM >60 10/17/2022 10:25 AM    GLUCOSE 105 10/17/2022 10:25 AM    PROT 8.3 2022 08:34 AM    CALCIUM 10.1 10/17/2022 10:25 AM    BILITOT 0.4 2022 08:34 AM    ALKPHOS 79 2022 08:34 AM    AST 30 2022 08:34 AM    ALT 47 2022 08:34 AM       POC Tests: No results for input(s): POCGLU, POCNA, POCK, POCCL, POCBUN, POCHEMO, POCHCT in the last 72 hours.     Coags:   Lab Results   Component Value Date/Time    PROTIME 11.5 2017 09:40 AM    INR 1.1 2017 09:40 AM    APTT 30.3 2017 09:40 AM       HCG (If Applicable):   Lab Results   Component Value Date    PREGTESTUR negative 2018        ABGs: No results found for: PHART, PO2ART, TFW4HOP, OPP1VFP, BEART, O3VOEIAI     Type & Screen (If Applicable):  No results found for: LABABO, LABRH    Drug/Infectious Status (If Applicable):  No results found for: HIV, HEPCAB    COVID-19 Screening (If Applicable):   Lab Results   Component Value Date/Time    COVID19 Positive 01/27/2022 01:23 PM    COVID19 Not Detected 12/23/2021 01:30 PM       ECHO: Findings 12/1/15      Left Ventricle   Left ventricular size and systolic function normal.      Right Ventricle   Normal right ventricle structure and function. Left Atrium   Left atrium is of normal size. Interatrial septum not well visualized but appears intact. Agitated saline bubble contrast given, sub-optimal images, no gross   evidence of interatrial shunt. Right Atrium   Normal right atrium. Mitral Valve   Normal mitral valve structure   No mitral valve prolapse. Tricuspid Valve   Normal tricuspid valve structure. Aortic Valve   Normal aortic valve structure      Pulmonic Valve   The pulmonic valve was not well visualized. Pericardial Effusion   No evidence of pericardial effusion. Pericardium appears normal.      Pleural Effusion   No evidence of pleural effusion. Aorta   Normal aortic root and ascending aorta. Conclusions      Summary   Left ventricular size and systolic function normal.   Agitated saline bubble contrast given, sub-optimal images, no gross   evidence of right to left interatrial shunt. No evidence of pericardial effusion.     EKG: 10/17/22  Ventricular Rate BPM 72  57      Atrial Rate BPM 72  57      P-R Interval ms 148  142  152  160    QRS Duration ms 70  62      Q-T Interval ms 358  390  356  364    QTc Calculation (Bazett) ms 392  379      P Axis degrees 14  13  30 R  25 R    R Axis degrees 51  59      T Axis degrees 25  50  42 R  [id number]           Narrative & Impression    Normal sinus rhythm     No significant change was found     When compared with ECG of 26-JUN-2018 21:33,  Criteria for Septal infarct are no longer present               Anesthesia Evaluation  Patient summary reviewed and Nursing notes reviewed no history of anesthetic complications:   Airway: Mallampati: II  TM distance: >3 FB   Neck ROM: full  Mouth opening: > = 3 FB   Dental: normal exam         Pulmonary: breath sounds clear to auscultation  (+) asthma:     (-) not a current smoker                           Cardiovascular:  Exercise tolerance: good (>4 METS),           Rhythm: regular             Beta Blocker:  Dose within 24 Hrs      ROS comment: POTS     Neuro/Psych:   (+) CVA ( left sided weakness/left sided numbness and tingling): residual symptoms, neuromuscular disease:, headaches: migraine headaches,              ROS comment: LUMBAR RADICULOPATHY   GI/Hepatic/Renal:   (+) hiatal hernia, GERD:, morbid obesity          Endo/Other:    (+) DiabetesType II DM, , hypothyroidism, blood dyscrasia ( Eliquis- last dose monday 10/17/22): anticoagulation therapy:., .          Pt had no PAT visit        ROS comment: HASHIMOTO'S  Abdominal:             Vascular:   + DVT, . Other Findings:             Anesthesia Plan      general     ASA 3       Induction: intravenous. MIPS: Postoperative opioids intended, Prophylactic antiemetics administered and Postoperative trial extubation. Anesthetic plan and risks discussed with patient. Use of blood products discussed with patient whom consented to blood products. Plan discussed with CRNA and attending.               Leticia Isaac RN   10/20/2022 details…

## 2022-10-27 ENCOUNTER — APPOINTMENT (OUTPATIENT)
Dept: BARIATRICS/WEIGHT MGMT | Facility: CLINIC | Age: 57
End: 2022-10-27

## 2022-10-27 VITALS — HEIGHT: 67 IN | BODY MASS INDEX: 37.35 KG/M2 | WEIGHT: 238 LBS

## 2022-10-27 DIAGNOSIS — Z01.818 ENCOUNTER FOR OTHER PREPROCEDURAL EXAMINATION: ICD-10-CM

## 2022-10-27 DIAGNOSIS — Z01.810 ENCOUNTER FOR PREPROCEDURAL CARDIOVASCULAR EXAMINATION: ICD-10-CM

## 2022-10-27 DIAGNOSIS — Z87.898 PERSONAL HISTORY OF OTHER SPECIFIED CONDITIONS: ICD-10-CM

## 2022-10-27 DIAGNOSIS — Z12.11 ENCOUNTER FOR SCREENING FOR MALIGNANT NEOPLASM OF COLON: ICD-10-CM

## 2022-10-27 DIAGNOSIS — E55.9 VITAMIN D DEFICIENCY, UNSPECIFIED: ICD-10-CM

## 2022-10-27 DIAGNOSIS — I10 ESSENTIAL (PRIMARY) HYPERTENSION: ICD-10-CM

## 2022-10-27 PROCEDURE — 99214 OFFICE O/P EST MOD 30 MIN: CPT | Mod: 95

## 2022-10-27 NOTE — HISTORY OF PRESENT ILLNESS
[FreeTextEntry1] : Bariatric surgery history: sleeve - 2019\par Obesity co-morbidities: HTN, ELISABETH,GERD\par Comorbidities improved or resolved: none\par Anti-obesity medications: metformin - started\par Obesity medication side effects: none\par \par PATIENT WAS NOTIFIED THAT ANYTHING WE DISCUSSED IN OUR MEETING TODAY MAY BE REFLECTED IN WRITING IN THE VISIT NOTE WHICH WILL BE AVAILABLE TO OTHER MEDICAL PROVIDERS TO REVIEW AS PART OF ROUTINE PATIENT CARE.  PATIENT VERBALLY AGREED. \par \par Ms. ELADIA PAYNE is a 57 year year old female who presents for evaluation and treatment of Class 2 obesity. \par \par Obesity related co- morbidities: HTN with heart murmur with palpitations, ELISABETH, GERD, prediabetes\par Sometimes uses cane 2/2 lower back issues\par \par Has GHI insurance through .\par Does not want to be on stimulants. \par \par Patient lives - \par Employment status - retired\par \par Weight History:\par Lowest adult weight: 150 \par Highest adult weight: 257\par \par Has gained 3-5 pounds over the past year.\par \par Obesity began in adulthood.  Weight gain has occurred with: She was doing well with sleeve - went back to work.  June 2019 - In a freak accident at work, broke ankle, low back pains with rods.  Been out of work ever since then.  Was around 202-205, doing well. Then covid, about 20 lb weight gain.  She was depressed with covid, and tension at home.  \par \par She recently went to [place], and started to skip breakfast and have an earlier dinner.  She has continued those habits. \par Past weight loss attempts include: 2002 - WW, liquid diet 2020 2021 2022, Yessi Mishra, obesity surgery , RDN.  These have produced a maximum of 58 pounds of weight loss.  \par Anti-obesity medications in the past: xenical.  never been on metformin, topiramate.  \par \par Reasons for desiring weight loss: health, be around for grandchild\par Perceived obstacles to losing weight: sleep\par \par Sleep: hours. Doesn't get that much sleep.  8-9pm -sleep a few hours,  then stay up at 3am, then force herself to sleep.  Then up at 6am. \par \par Has brunch, dinner - 2 regular meals a day. \par \par Diet history:\par wakes up at: 7-8am \par B: skips. water and vitamins\par L: 12pm - dinner leftovers - lean protein source, salad.  \par D: 6pm - chicken w spinach\par \par snacks: late night snacking - chips, cookies. these days, isn't keeping any at home. \par eating after dinner: yes\par overeating episodes: in the past, but not now. \par \par Sodas/fast food/processed foods: +popeyes/chinese - once a month, pizza. fried, broiled, baked\par \par Water intake per day: 16 oz x 4. +juice, much less these days.  \par No alcohol. \par green tea or herbal teas.\par \par Physical activity:\par Patient enjoys: walking\par Current physical activity: daily walking 20 minutes\par Wants to walk dog with granddaughter more\par \par Habits patient would like to change: decreased snacks\par Level of interest in losing weight: 5/5\par Community support: 5/5\par \par Factors that have helped in the past with losing weight and keeping it off: none\par

## 2022-10-27 NOTE — ASSESSMENT
[FreeTextEntry1] : 57 y.o F with Class 2 obesity, post sleeve w weight regain, prediabetes, [place], HTN, chronic lower back pain, presents for weight management\par \par - start metformin, incr to bid as tolerated\par - she's been avoiding since last week when she returned from , continue \par - she's going to try a protein shake from [employer]  - p 20g, 130 [place]\par - avoid juice/soda\par - cont water 64 oz\par - incr activ as tolerated\par - can see NP earlier\par \par f/u 4-6 weeks\par

## 2022-11-16 ENCOUNTER — APPOINTMENT (OUTPATIENT)
Dept: BARIATRICS/WEIGHT MGMT | Facility: CLINIC | Age: 57
End: 2022-11-16

## 2022-11-16 VITALS — BODY MASS INDEX: 36.88 KG/M2 | HEIGHT: 67 IN | WEIGHT: 235 LBS

## 2022-11-16 PROCEDURE — 97803 MED NUTRITION INDIV SUBSEQ: CPT | Mod: 95

## 2022-12-07 ENCOUNTER — APPOINTMENT (OUTPATIENT)
Dept: BARIATRICS/WEIGHT MGMT | Facility: CLINIC | Age: 57
End: 2022-12-07

## 2022-12-07 VITALS — WEIGHT: 236 LBS | BODY MASS INDEX: 36.96 KG/M2

## 2022-12-07 DIAGNOSIS — G47.33 OBSTRUCTIVE SLEEP APNEA (ADULT) (PEDIATRIC): ICD-10-CM

## 2022-12-07 DIAGNOSIS — K21.9 GASTRO-ESOPHAGEAL REFLUX DISEASE W/OUT ESOPHAGITIS: ICD-10-CM

## 2022-12-07 PROCEDURE — 99214 OFFICE O/P EST MOD 30 MIN: CPT | Mod: 95

## 2022-12-07 RX ORDER — TOPIRAMATE 25 MG/1
25 TABLET, FILM COATED ORAL
Qty: 60 | Refills: 0 | Status: ACTIVE | COMMUNITY
Start: 2022-12-07 | End: 1900-01-01

## 2022-12-07 NOTE — ASSESSMENT
[FreeTextEntry1] : 57 y.o F with Class 2 obesity, post sleeve w weight regain, prediabetes, Vaughn, HTN, chronic lower back pain, presents for weight management\par \par - continue metformin, incr to bid as tolerated\par - started topiramate for after dinner cravings for sweets \par - goal of 5 oz of protein at each meal\par - she's been avoiding since last week when she returned from [place] \par - she's going to try a protein shake from [employer]  - p 20g, 130 [place]\par - avoid juice/soda - she's been successful\par - cont water 64 oz\par - incr activ as tolerated - walking further to work, etc. \par - can see NP earlier\par \par f/u 4-6 weeks\par

## 2022-12-07 NOTE — HISTORY OF PRESENT ILLNESS
[FreeTextEntry1] : Bariatric surgery history: sleeve - 2019\par Obesity co-morbidities: HTN, ELISABETH,GERD\par Comorbidities improved or resolved: none\par Anti-obesity medications: metformin - started\par Obesity medication side effects: none\par \par PATIENT WAS NOTIFIED THAT ANYTHING WE DISCUSSED IN OUR MEETING TODAY MAY BE REFLECTED IN WRITING IN THE VISIT NOTE WHICH WILL BE AVAILABLE TO OTHER MEDICAL PROVIDERS TO REVIEW AS PART OF ROUTINE PATIENT CARE.  PATIENT VERBALLY AGREED. \par \par Ms. ELADIA PAYNE is a 57 year year old female who presents for evaluation and treatment of Class 2 obesity. \par \par Obesity related co- morbidities: HTN with heart murmur with palpitations, ELISABETH, GERD, prediabetes\par Sometimes uses cane 2/2 lower back issues\par \par Has GHI insurance through .\par Does not want to be on stimulants. \par \par Patient lives - \par Employment status - retired\par \par Weight History:\par Lowest adult weight: 150 \par Highest adult weight: 257\par \par Interim:\par - improving breakfast - more protein\par - still noticing that she's still hungry after lunch, and then at night, continues to cravings for sweets. \par \par Has gained 3-5 pounds over the past year.\par \par Obesity began in adulthood.  Weight gain has occurred with: She was doing well with sleeve - went back to work.  June 2019 - In a freak accident at work, broke ankle, low back pains with rods.  Been out of work ever since then.  Was around 202-205, doing well. Then covid, about 20 lb weight gain.  She was depressed with covid, and tension at home.  \par \par She recently went to [place], and started to skip breakfast and have an earlier dinner.  She has continued those habits. \par Past weight loss attempts include: 2002 - WW, liquid diet 2020 2021 2022, Yessi Mishra, obesity surgery , RDN.  These have produced a maximum of 58 pounds of weight loss.  \par Anti-obesity medications in the past: xenical.  never been on metformin, topiramate.  \par \par Reasons for desiring weight loss: health, be around for grandchild\par Perceived obstacles to losing weight: sleep\par \par Sleep: hours. Doesn't get that much sleep.  8-9pm -sleep a few hours,  then stay up at 3am, then force herself to sleep.  Then up at 6am. \par \par Has brunch, dinner - 2 regular meals a day. \par \par Diet history:\par wakes up at: 7-8am \par B: skips. water and vitamins\par L: 12pm - dinner leftovers - lean protein source, salad.  \par D: 6pm - chicken w spinach\par \par snacks: late night snacking - chips, cookies. these days, isn't keeping any at home. \par eating after dinner: yes\par overeating episodes: in the past, but not now. \par \par Sodas/fast food/processed foods: +popeyes/chinese - once a month, pizza. fried, broiled, baked\par \par Water intake per day: 16 oz x 4. +juice, much less these days.  \par No alcohol. \par green tea or herbal teas.\par \par Physical activity:\par Patient enjoys: walking\par Current physical activity: daily walking 20 minutes\par Wants to walk dog with granddaughter more\par \par Habits patient would like to change: decreased snacks\par Level of interest in losing weight: 5/5\par Community support: 5/5\par \par Factors that have helped in the past with losing weight and keeping it off: none\par

## 2022-12-12 ENCOUNTER — APPOINTMENT (OUTPATIENT)
Dept: BARIATRICS | Facility: CLINIC | Age: 57
End: 2022-12-12
Payer: COMMERCIAL

## 2022-12-12 VITALS
WEIGHT: 243.17 LBS | HEIGHT: 67 IN | OXYGEN SATURATION: 98 % | DIASTOLIC BLOOD PRESSURE: 90 MMHG | HEART RATE: 77 BPM | TEMPERATURE: 96.5 F | SYSTOLIC BLOOD PRESSURE: 130 MMHG | BODY MASS INDEX: 38.17 KG/M2

## 2022-12-12 DIAGNOSIS — K59.00 CONSTIPATION, UNSPECIFIED: ICD-10-CM

## 2022-12-12 PROCEDURE — 99215 OFFICE O/P EST HI 40 MIN: CPT

## 2022-12-12 PROCEDURE — 99401 PREV MED CNSL INDIV APPRX 15: CPT | Mod: 25

## 2022-12-12 RX ORDER — OMEPRAZOLE 20 MG/1
20 CAPSULE, DELAYED RELEASE ORAL DAILY
Qty: 30 | Refills: 1 | Status: DISCONTINUED | COMMUNITY
Start: 2019-06-17 | End: 2022-12-12

## 2022-12-12 NOTE — HISTORY OF PRESENT ILLNESS
[Procedure: ___] : Procedure performed: [unfilled]  [Date of Surgery: ___] : Date of Surgery:   [unfilled] [Surgeon Name:   ___] : Surgeon Name: Dr. HERRERA [Pre-Op Weight ___] : Pre-op weight was [unfilled] lbs [de-identified] : 57 year old F is s/p laparoscopic sleeve gastrectomy and hiatal hernia repair. Presents today for follow up with weight stable from last visit. Pt attributed weight gain at last visit from the pandemic and back surgery 2020 on bedrest for a period of time. Preop weight was 245, lowest weight 200 and current weight 243. Saw Obesity Medicine last 12/7/2022, continues to be on metformin and will start topiramate today. Saw Nutritionist Rylie Allen last 11/16/2022. Consuming an adequate protein intake with 2 meals/day and taking 15-20min to consume a meal. Due to busy schedule, trying to have protein shake for breakfast. Drinking adequate zero calorie liquid, 64oz/day. Occasional constipation, taking over the counter home remedies. Taking daily Bariatric vitamins chewables. Sleeping ~ 3-4 hours/night, pt says has things going on. Exercise consists of activities of daily living. No abdominal pain, reflux, nausea, or vomiting. [de-identified] : LAP BAND SURGERY [facility]     DOS 2008    DR ORTEGA\par LAP SLEEVE GASTRECTOMY WITH INTRA OP EGD AND HIATAL HERNIA REPAIR          DOS;1/7/2019            DR WAGONER    PRE OP WEIGHT - 240 LBS.

## 2022-12-12 NOTE — PHYSICAL EXAM
[Normal] : affect appropriate [de-identified] : soft, NT, ND, no diastasis or hernias appreciated, incisions well healed

## 2022-12-12 NOTE — ASSESSMENT
[FreeTextEntry1] : 57 year old F is s/p laparoscopic sleeve gastrectomy and hiatal hernia repair. Presents today for follow up with weight stable from last visit. Pt attributed weight gain at last visit from the pandemic and back surgery 2020 on bedrest for a period of time. Preop weight was 245, lowest weight 200 and current weight 243. Saw Obesity Medicine last 12/7/2022, continues to be on metformin and will start topiramate today. Saw Nutritionist Rylie Allen last 11/16/2022.\par \par Greater than 15 minutes spent with pt discussing importance of health maintenance principles including dietary and lifestyle changes as well as long-term weight maintenance\par Reviewed guidelines about nutrition and snacking - protein focus diet with 3 meals/day\par Encouraged better food choices - maintain food log\par Continue daily Bariatric MVI \par Daily zero calorie liquid intake goal of 64 oz/day - increase dietary fiber\par Constipation remedies list given to pt\par Encouraged to participate in a daily exercise regimen incorporating cardio and strength training\par Track steps - goal approximately 8-10k steps per day\par \par Labs performed 9/8/2022 reviewed with pt\par All within normal limits, with the exception of HbA1c 6.2% and Lipid panel\par told patient to discuss with her PCP\par \par Return to office in 3 months\par Follow up with Obesity Medicine as scheduled\par Follow up with Nutritionist Rylie Allen in 2 months\par All questions answered\par \par \par Additional time spent before and after visit reviewing chart

## 2023-01-19 ENCOUNTER — APPOINTMENT (OUTPATIENT)
Dept: BARIATRICS/WEIGHT MGMT | Facility: CLINIC | Age: 58
End: 2023-01-19

## 2023-02-14 ENCOUNTER — APPOINTMENT (OUTPATIENT)
Dept: BARIATRICS/WEIGHT MGMT | Facility: CLINIC | Age: 58
End: 2023-02-14
Payer: COMMERCIAL

## 2023-02-14 PROCEDURE — 97803 MED NUTRITION INDIV SUBSEQ: CPT | Mod: 95

## 2023-04-20 ENCOUNTER — APPOINTMENT (OUTPATIENT)
Dept: BARIATRICS | Facility: CLINIC | Age: 58
End: 2023-04-20
Payer: COMMERCIAL

## 2023-04-20 ENCOUNTER — APPOINTMENT (OUTPATIENT)
Dept: BARIATRICS/WEIGHT MGMT | Facility: CLINIC | Age: 58
End: 2023-04-20
Payer: COMMERCIAL

## 2023-04-20 VITALS
OXYGEN SATURATION: 99 % | TEMPERATURE: 96.6 F | WEIGHT: 248.9 LBS | BODY MASS INDEX: 39.07 KG/M2 | HEART RATE: 69 BPM | HEIGHT: 67 IN | DIASTOLIC BLOOD PRESSURE: 82 MMHG | SYSTOLIC BLOOD PRESSURE: 128 MMHG

## 2023-04-20 VITALS — WEIGHT: 248 LBS | BODY MASS INDEX: 38.92 KG/M2 | HEIGHT: 67 IN

## 2023-04-20 DIAGNOSIS — Z98.84 BARIATRIC SURGERY STATUS: ICD-10-CM

## 2023-04-20 DIAGNOSIS — E66.9 OBESITY, UNSPECIFIED: ICD-10-CM

## 2023-04-20 PROCEDURE — 97803 MED NUTRITION INDIV SUBSEQ: CPT | Mod: 95

## 2023-04-20 PROCEDURE — 99214 OFFICE O/P EST MOD 30 MIN: CPT

## 2023-04-20 RX ORDER — METFORMIN HYDROCHLORIDE 500 MG/1
500 TABLET, COATED ORAL TWICE DAILY
Qty: 60 | Refills: 1 | Status: DISCONTINUED | COMMUNITY
Start: 2022-10-27 | End: 2023-04-20

## 2023-04-25 NOTE — PHYSICAL EXAM
[Normal] : affect appropriate [de-identified] : soft, NT, ND, no evidence of hernia or diastasis, incisions well-healed

## 2023-04-25 NOTE — ASSESSMENT
[FreeTextEntry1] : 57 year old woman s/p Laparoscopic Sleeve Gastrectomy with hiatal hernia repair ([facility] Lap-Band placement). Patient doing well. Nutriton and exercise guidelines reviewed with the patient. \par \par Continue 3 protein-focused meals/day (aim for 60 g - 70 g protein/day); eliminate snacking and liquid calories\par Encourage zero calorie fluid intake (64 oz/day)\par Continue bariatric vitamins; vitamin sheet given at today's appointment\par Continue taking MiraLAX, as needed, for constipation; can also take Benefiber, daily (with 64 oz water per day, to avoid worsened constipation.\par Exercise with cardio (aim for 8k-10k steps/day), and strength training 2x/week\par Use step counter\par Weigh yourself 1x-2x/week\par Try to use stress-reduction techniques (e.g. Calm eduin., walking)\par Try to get more sleep per night\par Follow-up with psychologist\par Follow-up with nutritionist \par Follow-up in 3 months (~August)\par All questions answered\par \par Call with any questions or concerns\par \par Time before and after visit spent reviewing chart

## 2023-04-25 NOTE — HISTORY OF PRESENT ILLNESS
[Procedure: ___] : Procedure performed: [unfilled]  [Date of Surgery: ___] : Date of Surgery:   [unfilled] [Surgeon Name:   ___] : Surgeon Name: Dr. HERRERA [Pre-Op Weight ___] : Pre-op weight was [unfilled] lbs [de-identified] : 57 year old woman s/p Laparoscopic Sleeve Gastrectomy with hiatal hernia repair ([facility] Lap-Band). Patient doing well, but feels weight loss plateaued. Reports no abdominal pain, nausea/vomiting, diarrhea, reflux/heartburn; reports constipation (takes MiraLAX 2x/week). Patient eating 3 protein-rich meals/day, drinking adequate zero-calorie fluids/day, taking bariatric vitamins, and ambulates for exercise; reports 4 hours of sleep per night.\par Reports increased stress leading to poor food choices, and lack of sleep.\par \par Next nutritionist appointment 4/20/23 [de-identified] : Laparoscopic removal of Lap-Band and port, secondary to dysphagia and vomiting; 9/28/2016, Dr. Tanner, 235 lbs\par Lap-Band placement ([facility]), 2008, Dr. Dunn

## 2023-05-24 ENCOUNTER — APPOINTMENT (OUTPATIENT)
Dept: BARIATRICS/WEIGHT MGMT | Facility: CLINIC | Age: 58
End: 2023-05-24

## 2023-05-25 ENCOUNTER — APPOINTMENT (OUTPATIENT)
Dept: BARIATRICS/WEIGHT MGMT | Facility: CLINIC | Age: 58
End: 2023-05-25
Payer: COMMERCIAL

## 2023-05-25 DIAGNOSIS — Z98.84 BARIATRIC SURGERY STATUS: ICD-10-CM

## 2023-05-25 DIAGNOSIS — R73.03 PREDIABETES.: ICD-10-CM

## 2023-05-25 PROCEDURE — 97803 MED NUTRITION INDIV SUBSEQ: CPT | Mod: 95

## 2023-08-10 ENCOUNTER — APPOINTMENT (OUTPATIENT)
Dept: BARIATRICS | Facility: CLINIC | Age: 58
End: 2023-08-10

## 2024-09-30 NOTE — H&P PST ADULT - ASSESSMENT
Nichole - Care Coordinator called requesting Dr Solis call the Doxycycline 100mg #14 1 p o bid X 7 days to [facility].    Hospitalist called sent it to [facility] so he has not even started it yet.    Requesting it be called in today.    Date of last visit:  9/25/2024  Date of next visit:  10/4/2024    Requested Prescriptions     Pending Prescriptions Disp Refills    doxycycline hyclate (VIBRA-TABS) 100 MG tablet 14 tablet 0     Sig: Take 1 tablet by mouth 2 times daily for 7 days          55 y/o female with elevated calcium level, subsequently dx with hyperparathyroidism on further work-up.  Scheduled for Parathyroidectomy with parathyroid hormone assay

## 2025-01-11 NOTE — H&P PST ADULT - PSH
[facility] H/O laparoscopic adjustable gastric banding  2006  S/P rotator cuff repair  left and right  8/2015, 2/2016 [facility]